# Patient Record
(demographics unavailable — no encounter records)

---

## 2017-06-07 NOTE — EKG REPORT
SEVERITY:- BORDERLINE ECG -

SINUS RHYTHM

LOW VOLTAGE IN FRONTAL LEADS

CONSIDER INFERIOR INFARCT

:

Confirmed by: Amena Delaney MD 07-Jun-2017 16:18:13

## 2017-07-24 NOTE — ER DOCUMENT REPORT
ED Respiratory Problem





- General


Chief Complaint: Shortness Of Breath


Stated Complaint: DIFFICULTY BREATHING


Time Seen by Provider: 07/24/17 20:48


Notes: 


The patient is a 60-year-old female, past medical history COPD, asthma, 

presents with several hours of worsening shortness of breath that started after 

she exited a store today.  She tried her albuterol inhaler without much relief 

of her symptoms.  She called 911 and received 125 mg Solu-Medrol, a DuoNeb and 

an albuterol inhaler with some relief of her symptoms.  She is still short of 

breath on arrival to the emergency room.  She denies leg swelling, nausea, 

vomiting, chest pain, fevers, back pain, headache or abdominal pain.


TRAVEL OUTSIDE OF THE [place] IN LAST 30 DAYS: No





- Related Data


Allergies/Adverse Reactions: 


 





No Known Allergies Allergy (Verified 05/20/14 08:27)


 











Past Medical History





- General


Information source: Patient





- Social History


Smoking Status: Current Every Day Smoker


Family History: Reviewed & Not Pertinent





- Past Medical History


Cardiac Medical History: Reports: Hx Atrial Fibrillation


Pulmonary Medical History: Reports: Hx Asthma, Hx Bronchitis, Hx COPD, Hx 

Pneumonia


GI Medical History: Reports: Hx Gastroesophageal Reflux Disease - resolved 

after fundoplication


Psychiatric Medical History: Reports: Hx Depression


Past Surgical History: Reports: Hx Bowel Surgery - Nissen fundoplication, Hx 

Gynecologic Surgery - fibroid removal





- Immunizations


Hx Diphtheria, Pertussis, Tetanus Vaccination: Yes





Review of Systems





- Review of Systems


Notes: 


REVIEW OF SYSTEMS:


CONSTITUTIONAL: -fevers, -chills


EENT: -eye pain, -difficulty swallowing, -nasal congestion


CARDIOVASCULAR:-chest pain, -syncope.


RESPIRATORY: +cough, +SOB


GASTROINTESTINAL:  -abdominal pain, - nausea, -vomiting, -diarrhea


GENITOURINARY: -dysuria, -hematuria


MUSCULOSKELETAL: -back pain, -neck pain


SKIN: -rash or skin lesions.


HEMATOLOGIC: -easy bruising or bleeding.


LYMPHATIC: -swollen, enlarged glands.


NEUROLOGICAL: -altered mental status or loss of consciousness, -headache, -

neurologic symptoms


PSYCHIATRIC: -anxiety, -depression.


ALL OTHER SYSTEMS REVIEWED AND NEGATIVE.





Physical Exam





- Vital signs


Vitals: 


 











Resp Pulse Ox


 


 24 H  99 


 


 07/24/17 20:40  07/24/17 20:40














- Notes


Notes: 


PHYSICAL EXAMINATION:





GENERAL: Well-appearing, well-nourished and in no acute distress.





HEAD: Atraumatic, normocephalic.





EYES: Pupils equal round and reactive to light, extraocular movements intact, 

sclera anicteric, conjunctiva are normal.





ENT: nares patent, oropharynx clear without exudates.  Moist mucous membranes.





NECK: Normal range of motion, supple without lymphadenopathy





LUNGS: Bilateral wheezing, tachypnea





HEART: Tachycardia, regular rhythm





ABDOMEN: Soft, nontender, normoactive bowel sounds.  No guarding, no rebound.  

No masses appreciated.





EXTREMITIES: Normal range of motion, no pitting or edema.  No cyanosis.





NEUROLOGICAL: Cranial nerves grossly intact.  Normal speech, normal gait.  

Normal sensory and motor exams.





PSYCH: Normal mood, normal affect.





SKIN: Warm, Dry, normal turgor, no rashes or lesions noted.  Tachypnea,





Course





- Re-evaluation


Re-evalutation: 


Patient with COPD exacerbation.  After DuoNeb and steroids, patient feels much 

better.  CTA ordered due to tachycardia and slight hypoxia.  It did not show 

any evidence of pulmonary embolism.  Patient knows about her thyroid nodule/

mass and will talk to her primary care physician about further evaluation and 

treatment.  Patient ambulating around ER with oxygen saturation at 88%.  She 

does not wear oxygen at home.  She does not feel short of breath when she 

ambulates.  The 88% may be normal for her due to her long-standing emphysema.  

Offered patient admission for further evaluation and treatment, but she would 

like to be discharged home with follow-up at her primary care physician 

tomorrow.  She understands risks of leaving, including heart attack, death or 

worsening respiratory failure.  She is competent to make her own decisions.  

She has albuterol nebules at home, but is requesting a refill of her albuterol 

inhaler.  We will also provide her with 5 more days of prednisone and 

instructed patient to return immediately to the emergency room if she has any 

worsening shortness of breath or any other concerns.





- Vital Signs


Vital signs: 


 











Temp Pulse Resp BP Pulse Ox


 


 97.6 F   113 H  20   130/87 H  88 L


 


 07/24/17 20:50  07/24/17 20:50  07/25/17 01:30  07/25/17 01:30  07/25/17 01:30














- Laboratory


Result Diagrams: 


 07/24/17 21:05





 07/24/17 22:53


Laboratory results interpreted by me: 


 











  07/24/17 07/24/17





  21:05 22:53


 


Hgb  15.8 H 


 


Seg Neutrophils %  37.2 L 


 


Lymphocytes %  47.7 H 


 


Glucose   134 H














- Diagnostic Test


Radiology reviewed: Image reviewed, Reports reviewed


Radiology results interpreted by me: 


CXR: NAD





- EKG Interpretation by Me


EKG shows normal: Sinus rhythm


Rate: Tachycardia


Axis/QRS: IVCD


When compared to previous EKG there are: No significant change





Discharge





- Discharge


Clinical Impression: 


 COPD exacerbation





Condition: Stable


Disposition: AGAINST MEDICAL ADVICE


Additional Instructions: 


UPPER RESPIRATORY ILLNESS:





     You have a viral infection of the respiratory passages -- a "cold."  This 

common infection causes nasal congestion, drainage, and often sore throat and 

cough.  It is highly contagious.  The disease usually lasts about 10 to 14 days.


     There is no "cure" for the viral infection -- it must run its course.  If 

there is a complication, such as bacterial infection in the nose, sinuses, 

middle ear, or bronchial tubes, antibiotics may be required.  The antibiotics 

won't affect the virus.


     Drink plenty of fluids.  A humidifier may help.  An expectorant medication 

or decongestant may make you more comfortable.  Use acetaminophen or ibuprofen 

for fever or aches.


     See the doctor if fever persists over two days, if there is any 

significant worsening of your symptoms, or if you simply fail to improve as 

expected.








BRONCHOSPASM:





     You have tightness in the bronchial tubes, called bronchospasm. This often 

occurs with bronchial infections.  Allergies, inhaled chemicals, and polluted 

or cold air can also provoke bronchospasm. It's more likely in patients with 

asthma in the family.


     Emergency treatment of bronchospasm may include adrenaline shots or 

bronchodilator aerosol.  You may feel lightheaded and have a rapid pulse for an 

hour or two.  Rest and get plenty of fluids.


     At home, we'll treat you with a bronchodilator inhaler. Antibiotics and 

corticosteroids may be required for some patients. Until you recover, avoid 

chemical fumes, dusts, pollens, and exercising in very cold or dry air. If you 

smoke, stop now!!


     If you develop a fever, increased wheezing, chest pain, or severe 

shortness of breath, you should contact the doctor immediately.








INHALED BRONCHODILATORS:


     You have received a treatment of and/or prescription for an inhaled 

bronchodilator -- a medication which stimulates the airways in the lung to 

dilate.  This improves the flow of air in asthma, bronchitis, and emphysema.


     These medicines have some similarity to adrenaline, and can cause similar 

side effects:  shakiness, racing heart, and a sense of nervousness.  These side 

effects decrease with time.  Contact your doctor if these side effects are 

severe.


     Do not over-use the medicine.  Too-frequent use of the inhaler may make it 

ineffective.  Call your doctor if the inhaler is not controlling your symptoms 

at the prescribed doses.








STEROID MEDICATION:


     You have been given an injection of or oral medicine of the cortisone/

steroid class.  This medication is used to control inflammation or allergy.  

David t is usually only given for a short period of time, until the acute process 

subsides.


     There are usually no side effects from short-term use of cortisone-like 

medications.  Some persons feel an increased sense of well-being and are not 

sleepy at bedtime.  Long-term use of cortisone medications is best avoided, 

unless required for a severe condition.  If your condition does not remit, or 

relapses after the course of corticosteroid medication, you should consult your 

physician.








USE OF ACETAMINOPHEN (Tylenol):


     Acetaminophen may be taken for pain relief or fever control. It's much 

safer than aspirin, offering a wider range of "safe" dosages.  It is safe 

during pregnancy.  Some brand names are Tylenol, Panadol, Datril, Anacin 3, 

Tempra, and Liquiprin. Acetaminophen can be repeated every four hours.  The 

following are maximum recommended dosages:


>89 pounds or adults          650 mg to 900 mg


Acetaminophen can be repeated every four hours.  Maximum dose not to exceed 

4000 mg a day.








SMOKING:


     If you smoke, you should stop smoking.  The tar and chemicals in cigarette 

smoke are harmful.  Smoking has been shown to cause:


          emphysema


          chronic bronchitis


          lung cancer


          mouth and throat cancer


          stomach and pancreas cancer


          premature aging


          birth defects


     In addition, smoking increases ear and lung infections in children of 

smokers.








FOLLOW-UP CARE:


If you have been referred to a physician for follow-up care, call the physician

s office for an appointment as you were instructed or within the next two days.

  If you experience worsening or a significant change in your symptoms, notify 

the physician immediately or return to the Emergency Department at any time for 

re-evaluation.





Prescriptions: 


Albuterol Sulfate [Proair HFA Inhalation Aerosol 8.5 gm MDI] 2 puff IH Q4H PRN #

1 mdi


 PRN Reason: 


Prednisone [Deltasone 20 mg Tablet] 3 tab PO DAILY 5 Days


Forms:  Return to Work

## 2017-07-25 NOTE — EKG REPORT
SEVERITY:- ABNORMAL ECG -

SINUS OR ECTOPIC ATRIAL TACHYCARDIA

NONSPECIFIC IVCD WITH LAD

INFERIOR INFARCT, OLD

LATERAL INFARCT, AGE INDETERMINATE

ABNRM R PROG, CONSIDER ASMI OR LEAD PLACEMENT

:

Confirmed by: Amena Delaney MD 25-Jul-2017 12:47:00

## 2017-07-25 NOTE — RADIOLOGY REPORT (SQ)
EXAM DESCRIPTION:  CTA CHEST



COMPLETED DATE/TIME:  7/25/2017 12:31 am



REASON FOR STUDY:  SOB, tachycardia, abnormal EKG



COMPARISON:  Chest x-ray 7/24/2017, CT chest 6/24/2015.



TECHNIQUE:  CT scan of the chest performed using helical scanning technique with dynamic intravenous 
contrast injection.  Images reviewed with lung, soft tissue and bone windows.  Reconstructed coronal 
and sagittal MPR images reviewed.

Additional 3 dimensional post-processing performed to develop Maximal Intensity Projection images (MI
P).  All images stored on PACS.

All CT scanners at this facility use dose modulation, iterative reconstruction, and/or weight based d
osing when appropriate to reduce radiation dose to as low as reasonably achievable (ALARA).

[facility]: Dose Right  [facility]: CareDose    [facility]: Dose Right    [facility]: Teradose 4D    [facility]: Smart Technologies



CONTRAST TYPE AND DOSE:  contrast/concentration: Isovue 370.00 mg/ml; Total Contrast Delivered: 100.0
 ml; Total Saline Delivered: 45.0 ml



RENAL FUNCTION:  Creatinine 0.78



RADIATION DOSE:  Up-to-date CT equipment and radiation dose reduction techniques were employed. CTDIv
ol: 14.3 mGy. DLP: 495 mGy-cm. .



LIMITATIONS:  There is motion artifact.



FINDINGS:  LUNGS AND PLEURA: Emphysematous changes are noted.  No consolidation, pleural effusion pne
umothorax.

AORTA AND GREAT VESSELS: No thoracic aortic aneurysm or dissection.

HEART: No pericardial effusion.

PULMONARY ARTERIES: No emboli visualized in the main pulmonary arteries or the segmental branches.

HILAR AND MEDIASTINAL STRUCTURES: No identified masses or abnormal nodes.

HARDWARE: None in the chest.

UPPER ABDOMEN: Surgical clips at the GE junction.  No acute findings.  Limited exam.

THYROID AND OTHER SOFT TISSUES: There is a 1.4 x 0.8 cm hypodense nodule at the right thyroid lobe.

BONES: No acute osseous findings.

3D MIPS: Confirm above findings.



IMPRESSION:  No CT evidence for pulmonary emboli.

Emphysema.

1.4 x 0.8 cm hypodense nodule at the right thyroid lobe.  This can be better evaluated with dedicated
 thyroid ultrasound.



TECHNICAL DOCUMENTATION:  JOB ID:  [id number]

[facility]

Quality ID # 436: Final reports with documentation of one or more dose reduction techniques (e.g., Au
tomated exposure control, adjustment of the mA and/or kV according to patient size, use of iterative 
reconstruction technique)

 2011 [employer]- All Rights Reserved

## 2017-08-07 NOTE — XCELERA REPORT
[address] [address]

                             Tel: [phone]

                             Fax: [phone]



                    Lower Extremity Arterial Evaluation

____________________________________________________________________________



Name: EWELINA POTTS

MRN: [id number]                Age: 60 yrs

Gender: Female                 : 1956

Patient Status: Outpatient     Patient Location: 

Account #: [id number]

Study Date: 2017 03:19 PM

Accession #: [id number]

____________________________________________________________________________



Procedure: A color flow and duplex scan of the lower extremity arteries was

performed bilaterally with velocity and waveform anaylsis. Ankle brachial

indicies performed.

Reason For Study: PVD





Ordering Physician: RICHMOND ENNIS

Performed By: Sam Marcum

____________________________________________________________________________



____________________________________________________________________________





Measurements and Calculations



                                   Right         Left

  CFA PSV                          122.6        116.6    cm/sec

  Prox PFA PSV                     -93.2        -66.0    cm/sec

  Dist SFA PSV                     -78.6        -69.1    cm/sec

  Dist Pop A PSV                    71.7         47.3    cm/sec

  Dist FRANNIE PSV                      71.1         67.4    cm/sec

  Dist PTA PSV                      47.8         47.5    cm/sec

  Sly Pedis PSV                     79.9         57.3    cm/sec



____________________________________________________________________________



Right Side Arterial Evaluation

Normal velocity and triphasic waveforms noted from the Common Femoral

artery to the infregeniculate vessels.



0 % stenosis noted.



Ankle Brachial index is 1.04.



Left Side Arterial Evaluation

Normal velocity and triphasic waveforms noted from the Common Femoral

artery to the infregeniculate vessels.



0 % stenosis noted.



Ankle Brachial index is 1.06.

____________________________________________________________________________



Interpretation Summary

No hemodynamically significant lesions in the bilateral lower extremities,

on duplex imaging, at rest.

____________________________________________________________________________



Electronically signed by:      Lennox Williams      on 2017 04:17 PM



CC: RICHMOND ENNIS

>

Williams, Lennox

## 2017-08-09 NOTE — RADIOLOGY REPORT (SQ)
EXAM DESCRIPTION:  U/S THYROID/SFT TISS HD   NECK



COMPLETED DATE/TIME:  8/9/2017 5:20 pm



REASON FOR STUDY:  NONTOXIC SINGLE THYROID NODULE, ABNORMAL WEIGHT LOSS E04.1  NONTOXIC SINGLE THYROI
D NODULE



COMPARISON:  None.



TECHNIQUE:  Dynamic and static gray-scale images acquired of the thyroid gland. Selected additional c
olor/power Doppler images recorded.  All images stored to PACS.



LIMITATIONS:  None.



FINDINGS:  RIGHT LOBE: Normal size, 3.9 x 1.9 x 2.5 cm.  Homogeneous echotexture.  There is complex n
odule in the midportion of the right lobe measuring 11 x 9 x 11 mm.  Multiple smaller nodules are pre
sent in the right lobe.

LEFT LOBE: Normal size, 3.9 x 1.6 x 1.6 cm.  Homogeneous echotexture.  Multiple small hypoechoic nodu
les are present.

ISTHMUS: Normal size, 2.9 mm.  Homogeneous echotexture.  No cystic or solid masses.

OTHER: No other significant finding.



IMPRESSION:  Multinodular goiter.



TECHNICAL DOCUMENTATION:  JOB ID:  [id number]

 [employer]- All Rights Reserved

## 2017-08-11 NOTE — RADIOLOGY REPORT (SQ)
EXAM DESCRIPTION:  CT ABDOMEN WITH IV   ORAL CONT



COMPLETED DATE/TIME:  8/11/2017 9:33 am



REASON FOR STUDY:  COLON POLYP, WEIGHT LOSS D12.6  BENIGN NEOPLASM OF COLON, UNSPECIFIED R63.4  ABNOR
MAL WEIGHT LOSS



COMPARISON:  None.



TECHNIQUE:  CT scan of the abdomen performed with intravenous and with oral contrast using helical sc
anning technique with dynamic intravenous contrast injection. Images reviewed with lung, soft tissue,
 and bone windows. Reconstructed coronal and sagittal MPR images reviewed. Delayed images for evaluat
ion of the urinary system also acquired and evaluated.

All images stored on PACS. All CT scanners at this facility use dose modulation, iterative reconstruc
tion, and/or weight based dosing when appropriate to reduce radiation dose to as low as reasonably ac
hievable (ALARA).

[facility]: Dose Right  [facility]: CareDose  [facility]: Dose Right    [facility]: Teradose 4D    [facility]: Smart Technologies



CONTRAST TYPE AND DOSE:  contrast/concentration: Isovue 370.00 mg/ml; Total Contrast Delivered: 66.0 
ml; Total Saline Delivered: 65.0 ml



RENAL FUNCTION:  Creatinine 0.7 BUN 9



RADIATION DOSE:  Up-to-date CT equipment and radiation dose reduction techniques were employed. CTDIv
ol: 3.8 - 4.5 mGy. DLP: 268 mGy-cm. .



LIMITATIONS:  None.



FINDINGS:  LOWER CHEST: No significant findings. No nodules or infiltrates.

LIVER: Normal size. No masses.  No dilated ducts.

SPLEEN: Normal size. No focal lesions.

PANCREAS: No masses. No significant calcifications. No adjacent inflammation or peripancreatic fluid 
collections. Pancreatic duct not dilated.

GALLBLADDER: No identified stones by CT criteria. No inflammatory changes to suggest cholecystitis.

ADRENAL GLANDS: No significant masses or asymmetry.

RIGHT KIDNEY AND URETER: No solid masses.   No significant calcifications.   No hydronephrosis or hyd
roureter.

LEFT KIDNEY AND URETER: No solid masses.   No significant calcifications.   No hydronephrosis or hydr
oureter.

AORTA AND VESSELS: No aneurysm. No dissection. Renal arteries, SMA, celiac without stenosis.  Mild at
herosclerosis.

RETROPERITONEUM: No retroperitoneal adenopathy, hemorrhage or masses.

BOWEL AND PERITONEAL CAVITY: There appears to be focal thickening of the wall of the colon just above
 the cecum.  It is possible that this represents adherent stool appear

APPENDIX: Not identified.

ABDOMINAL WALL: No masses. No hernias.

BONES: No significant or acute findings.

OTHER: No other significant finding.



IMPRESSION:  Possible mass or polyp in the colon just above the cecum.  No other abnormality is appre
ciated.



TECHNICAL DOCUMENTATION:  JOB ID:  [id number]

Quality ID # 436: Final reports with documentation of one or more dose reduction techniques (e.g., Au
tomated exposure control, adjustment of the mA and/or kV according to patient size, use of iterative 
reconstruction technique)

 2011 [employer]- All Rights Reserved

## 2017-08-18 NOTE — OPERATIVE REPORT
Operative Report


DATE OF SURGERY: 08/18/17


PREOPERATIVE DIAGNOSIS: right colon polyp


POSTOPERATIVE DIAGNOSIS: Right colon polyp, spigelian hernia.


OPERATION: Right hemicolectomy.  Spigelian hernia repair.


SURGEON: AMEENA FINLEY


ANESTHESIA: GA


TISSUE REMOVED OR ALTERED: Right colon


COMPLICATIONS: 





None


ESTIMATED BLOOD LOSS: 100 cc


INTRAOPERATIVE FINDINGS: Large mass in the right mid colon.  Spigelian hernia 

with herniation of the appendix on the right side.


PROCEDURE: 





Informed consent was obtained.  Patient was brought to the operating room and 

placed on the operating room table in the supine position.  After satisfactory 

induction of general anesthesia patient's abdomen was prepped and draped in 

usual sterile fashion.  A midline abdominal incision was made dissection 

carried out through the fascia and the peritoneal cavity entered without 

difficulty.  A wound protractor was used during the case.  Exploratory 

laparotomy was performed first.  The liver felt smooth with no masses.  

Anterior surface of the stomach felt smooth.  There were some adhesions of the 

stomach to the anterior abdominal wall consistent with her prior ulcer 

surgeries.  These adhesions were left alone.  The gallbladder appeared normal.  

The uterus and the ovaries were palpable and they felt normal.  The small bowel 

appeared normal.  The right colon had a Maryjo ink marking at the midportion 

with palpable mass.  The appendix was adhered to the anterior right abdominal 

wall.  Otherwise the anterior abdominal wall felt normal.  In tracing the 

appendix became very apparent that the appendix was herniating through a 

spigelian hernia on the right side.  The spigelian hernia defect had to be 

widened to allow exposure of the appendiceal tip which extended deep into the 

abdominal wall.  A counter incision on the skin had to be made at the right 

abdomen to allow exposure.  The appendix tip was clearly identified dissected 

away from the abdominal wall.  At the end of the case the spigelian hernia 

defect was closed with interrupted PDS sutures.  The right colon was mobilized 

and the hepatic flexure was completely taken down.  The duodenum was identified 

and protected during the dissection as well as the right ureter.  The 

transverse colon just right of midline was divided using a CHELSEA stapling device.

  The middle colic artery was identified and preserved.  The marginal vessel 

was identified and taken with the LigaSure device.  The transverse colon end 

had an excellent blood supply with visible pulsations in the mesentery.  The 

ileocolic artery was identified and traced down to its origin and taken close 

to its origin by clamping dividing and tying.  This allowed a complete 

mesenteric excision of the right colon.  The terminal ileum was divided about 

10 cm away from the ileocecal junction using a CHELSEA stapling device.  The 

specimen was sent to pathology and pathology confirmed that indeed the large 

mass in the right colon had been removed with good margins.  Ileocolic 

anastomosis was then created between the terminal ileum and the transverse 

colon in a side-to-side functional end-to-end fashion using a CHELSEA stapling 

device.  The enterotomies made to introduce the stapling device was closed with 

a TA stapling device.  The mesenteric defect was very large and was left open.  

The anastomosis appeared secure and well vascularized.  Hemostasis appeared to 

be good.  Sponge needle and instrument counts were all correct.  The midline 

fascial defect was closed with running PDS suture.  Skin was closed with 

jarocho.  Marcaine was injected at the incision sites.  Patient tolerated 

procedure well with no apparent complications and was taken to the recovery 

area in stable condition.

## 2017-08-18 NOTE — CONSULTATION REPORT E
Consultation Report



NAME: EWELINA POTTS

MRN:  [id number]               : 1956      AGE: 60Y

DATE: 2017    534  A



TO:   TABITHA JOSE M.D.



FROM: CLINT FINLEY M.D.

      Requesting Physician



REASON FOR CONSULTATION:

Asthma/COPD and hyperthyroidism.



HISTORY OF PRESENT ILLNESS:

The patient is a 60-year-old -American female with a history of

COPD, anxiety, peripheral vascular disease, who presented to the hospital

for a right hemicolectomy and appendectomy due to a right colonic mass. 

Please see Dr. Finley's note and H and P for the indications for this

procedure.  The patient reports that Tuesday was the end of a small

steroid taper that she had taken for an asthma exacerbation.  She reports

that she does not normally take steroids daily, however, has had 2 short

courses of steroids within the last month.



PAST MEDICAL HISTORY:

The patient's past medical history is significant for:

1.  COPD.

2.  Questionable history of peripheral vascular disease.

3.  Anxiety.

4.  And a thyroid nodule.



PAST SURGICAL HISTORY:

Peptic ulcer disease about 26 years ago.



SOCIAL HISTORY:

The patient smokes a little less than a pack per day for many years. 

Denies alcohol.  Denies illicit drugs.



FAMILY HISTORY:

Her mother  of kidney cancer.  There is a family history of diabetes

mellitus, and her father's history is unknown.



ALLERGIES:

The patient's no known drug allergies.



HOME MEDICATIONS:

Include:

1.  Albuterol.

2.  BREO.

3.  Spiriva.

4.  And the patient recently completed a short course of prednisone.



CODE STATUS:

She is a FULL CODE.  Her sonKenton is her surrogate decision maker. 

Her primary care physician is Dr. Jansen.  Her surgeon is Dr. Finley.



REVIEW OF SYSTEMS:

CONSTITUTIONAL:  The patient denies fevers, chills, weight gain, anorexia.

Admits to weight loss, about 25 pounds over the last year.



HEENT:  Denies visual disturbance, headache, hearing loss.



RESPIRATORY:  Admits to occasional dyspnea.  Denies cough, hemoptysis,

pleurisy.



CARDIAC:  Denies chest pain, PND, orthopnea, edema.



ABDOMEN:  Admits currently to abdominal pain, however, she just had recent

abdominal surgery.



GENITOURINARY:  Denies dysuria, urgency, frequency, hematuria.



SKIN:  Denies rashes or wounds.



MUSCULOSKELETAL:  Denies joint pain, swelling.



NEUROLOGIC:  Denies weakness, numbness, dizziness, dysphagia, dysarthria,

ataxia.



ENDOCRINE:  Denies polyuria, polydipsia, hot or cold intolerance.



PSYCHIATRIC:  Denies depression.  Admits to some anxiety.  Denies

hallucination or delusion.



HEMATOLOGY:  Denies easy bleeding or bruising.



PHYSICAL EXAMINATION:

VITAL SIGNS:  Temperature is 98.5, pulse is 94, blood pressure of 129/82,

respiratory rate of 16, and saturation of 98 on 2 liters nasal cannula.



GENERAL:  The patient appears well.  She is in no acute distress.  She is

slightly lethargic but easily arousable after surgery.



HEENT:  She is normocephalic.  She has no icterus.  Her conjunctivae are

clear.  Her extraocular eye movements are intact.  Her pupils are equal,

round, and reactive to light and accommodation.  She has moist mucous

membranes.



NECK:  Her trachea is midline.  She does have a right-sided thyromegaly

with palpable nodules.



RESPIRATORY:  Clear to auscultation bilaterally without wheezes, rhonchi,

or rales.  She does have a prolonged expiratory phase.



CARDIOVASCULAR:  Regular rate and rhythm without appreciable murmur, rub,

or gallop.



ABDOMEN:  She does have a midline incision that is covered with waffle

dressing with sanguinous drainage.  The patient is diffusely tender. 

Absent bowel sounds.



RECTAL:  Deferred.



GENITOURINARY:  Deferred.



EXTREMITIES:  Reveal no edema, cyanosis, or clubbing.



MUSCULOSKELETAL:  Reveals no joint swelling or deformity.



VASCULAR:  Reveals normal peripheral pulses.



NEUROLOGIC:  She is alert and oriented to person, place, and time.  Her

speech is normal.  Cranial nerves are grossly intact.  Her strength is

equal in all extremities, and tactile sensation in all extremities.



SKIN:  Reveals no rashes, wounds, or worrisome skin lesions.



PSYCHIATRIC:  She has a normal mood and affect.



LABORATORY VALUES:

Most recent laboratory values are from 2017 and reveal a white count

of 5.1, hemoglobin of 15.2, hematocrit of 45.1, and platelets of 277,000;

a CEA of 4.8; a TSH of 0.39, a free T4 of 1.39, and a free T3 of 4.46; a

creatinine of 0.7 and a potassium of 4.6.  Thyroid ultrasound done on

2017 reveals a multinodular goiter with a complex nodule in the

midportion of the right lobe measuring 11 x 9 x 11 mm and multiple smaller

nodules present in the left lobe and the left lobe has small hypoechoic

nodules present.  The patient's CT of the abdomen done on 2017

reveals a possible mass or polyp in the colon just above the cecum.



IMPRESSION:

This is a 60-year-old -American female with:

1.  Status post right hemicolectomy and appendectomy for colonic mass.

2.  Subclinical hyperthyroidism, likely secondary to multinodular goiter

versus toxic adenoma.

3.  COPD.

4.  Tobacco abuse.



INPATIENT MEDICATIONS:

1.  Albuterol.

2.  Ertapenem 1 gram preoperatively.

3.  Hydrocortisone 50 mg IV q.12.

4.   mL IV.

5.  Morphine 3 mg IV q.3 p.r.n.

6.  Zofran 4 mg IV q.4 p.r.n.

7.  Spiriva.

8.  BREO.

9.  Normal saline at 120 mL per hour.



PLAN:

1.  For patient's asthma/COPD, at this time I do not feel that this

patient is likely steroid dependent nor does it sound that way from

listing to her.  Ultimately though I defer the use of corticosteroids to

her surgeon.  We will place p.r.n. albuterol and do recommend that patient

continue her own BREO.  Continue Spiriva.  Incentive spirometry and early

mobilization.



2.  For her colonic mass, abdominal surgery, and/or all problems and/or

issues relating to this, I defer to her excellent primary surgeon.



3.  For her tobacco abuse, I strongly recommend that she does stop

smoking, and we will place a p.r.n. nicotine patch.



4.  For her thyroid nodule, it appears that the patient is already engaged

in an outpatient workup of this.  The patient has undergone ultrasound of

the thyroid as well as testing which does seem to indicate some clinical

hyperthyroidism.  Whether this is the result of her multinodular goiter or

a toxic adenoma at this time is yet to be known.  I do recommend that this

patient undergo bone density testing as an outpatient and have a repeat

TSH and free T3 and free T4 within the next 6 weeks.  Ultimately, patient

should have a radioactive uptake scan of her thyroid to determine if these

nodules are indeed active.  At this time, I feel that this workup can be

deferred to on an outpatient basis.  The patient does admit to some weight

loss and occasional palpitations, but no other signs of overt

hyperthyroidism at this time.



We appreciate this most interesting consult, and we will continue to

follow this patient.



Please note that we deferred DVT prophylaxis other than SCDs which are

currently in place to the prerogative of the attending surgeon.





DICTATING PHYSICIAN: TABITHA JOSE M.D.





[id number]                  DT: 2017

PHY#: [id number]            DD: 2017

ID:   [id number]           JOB#: [id number]      ACCT: [id number]



cc:TABITHA JOSE M.D.

>

## 2017-08-18 NOTE — PDOC PROGRESS REPORT
Subjective


Progress Note for:: 08/18/17


Subjective:: 


Drowsy but easily arousable.  No distress.








Physical Exam


Vital Signs: 


 











Temp Pulse Resp BP Pulse Ox


 


 98.2 F   136 H  18   166/100 H  95 


 


 08/18/17 13:10  08/18/17 13:10  08/18/17 13:10  08/18/17 13:10  08/18/17 13:10








 Intake & Output











 08/17/17 08/18/17 08/19/17





 06:59 06:59 06:59


 


Intake Total   5150


 


Output Total   2100


 


Balance   3050


 


Weight  61.23 kg 











General appearance: PRESENT: no acute distress, cooperative


Respiratory exam: PRESENT: clear to auscultation taniya


Cardiovascular exam: PRESENT: RRR


GI/Abdominal exam: PRESENT: other - Soft, nondistended, appropriate tenderness.


Extremities exam: PRESENT: other - No swelling.





Assessment & Plan





- Diagnosis


(1) Colon polyp


Qualifiers: 


   Colon location: ascending 


Is this a current diagnosis for this admission?: Yes   


Plan: 


Status post right hemicolectomy and spigelian hernia repair.  Patient looks 

good postoperatively.  Was noted with tachycardia in the operating room prior 

to incision and  tachycardia postoperatively, currently resolved.  Patient with 

thyroid goiter.   Patient may benefit from a beta-blocker however she has 

significant asthma issues.  Will consult hospitalist for their input.  She has 

required steroids in the past.  I will place her on low-dose stress steroids in 

the immediate postoperative period.  Await bowel function.  If NG output is 

minimal will DC NG tube tomorrow.  Encourage ambulation.

## 2017-08-19 NOTE — PDOC PROGRESS REPORT
Subjective


Progress Note for:: 08/19/17


Subjective:: 


Patient complains of sore throat. 


Patient is yet to pass flatus.


She complains of nausea and abdominal pain.


Patient has dark NG aspirate tinged with blood.








Physical Exam


Vital Signs: 


 











Temp Pulse Resp BP Pulse Ox


 


 97.9 F   91   18   130/71 H  97 


 


 08/19/17 12:00  08/19/17 12:00  08/19/17 12:00  08/19/17 12:00  08/19/17 12:00








 Intake & Output











 08/18/17 08/19/17 08/20/17





 06:59 06:59 06:59


 


Intake Total  5550 


 


Output Total  3355 


 


Balance  2195 


 


Weight 61.23 kg 64 kg 











Exam: 





General: Awake alert and oriented x3, no acute respiratory distress


HEENT: AT/NC, PERRL, EOMI, oropharynx is moist, pink, no scleral icterus, no 

conjunctival injection, NG right nares


Neck: No JVD, trachea midline


Chest: Prolonged expiratory phase, Clear to auscultation bilaterally, no 

wheezes rhonchi or rales


CV: Regular rate and rhythm, normal S1 and S2, no murmur, rub, or gallop


Abdomen: Soft, appropriately, diffusely tender to palpation, nondistended, 

absent bowel sounds; no rebound, rigidity, or guarding


Extremities: No cyanosis, clubbing or edema


Neuro: Cranial nerves II through XII are grossly intact without focal deficits; 

awake alert and oriented x3


Psych: Normal mood and affect


Skin: no rashes or lesions





Results


Laboratory Results: 


 





 08/19/17 05:35 





 08/19/17 05:35 





 











  08/19/17 08/19/17





  05:35 05:35


 


WBC  11.8 H 


 


RBC  4.01 


 


Hgb  12.4 


 


Hct  36.9 


 


MCV  92 


 


MCH  30.8 


 


MCHC  33.5 


 


RDW  13.6 


 


Plt Count  235 


 


Sodium   133.0 L


 


Potassium   4.5


 


Chloride   106


 


Carbon Dioxide   24


 


Anion Gap   3 L


 


BUN   7


 


Creatinine   0.60


 


Est GFR ( Amer)   > 60


 


Est GFR (Non-Af Amer)   > 60


 


Glucose   100


 


Calcium   9.0














Assessment & Plan





- Diagnosis


(1) Colonic mass


Is this a current diagnosis for this admission?: Yes   


Plan: 


patient is s/p partial colectomy for this


Defer this and all issues relating to the diagnosis, management and treatment 

of this and its complications, if any, to the primary sugical team








(2) COPD (chronic obstructive pulmonary disease)


Qualifiers: 


   COPD type: emphysema   Emphysema type: unspecified   Qualified Code(s): 

J43.9 - Emphysema, unspecified   


Is this a current diagnosis for this admission?: Yes   


Plan: 


Currently well controlled


Continue home Breo, spiriva, prn albuterol


Currently receiving stress dose steroids, but defer to surgery to stop these as 

patient does not report chronic prednisone usage, but only intermittent use of 

corticosteroids 








(3) Thyroid nodule


Is this a current diagnosis for this admission?: Yes   


Plan: 


Advise repeating thyroid function tests in 4 weeks.


Patient should have radioactive iodine uptake test to see if these nodules are 

hot or cold. This can be done as an outpatient. No need for intervention at 

this time. 











  08/07/17





  11:40


 


TSH  0.39 L


 


Free T4  1.39


 


Free T3 pg/mL  4.46














(4) Subclinical hyperthyroidism


Is this a current diagnosis for this admission?: Yes   


Plan: 





Advise repeating these in 4 weeks.


Patient should have radioactive iodine uptake test to see if these nodules are 

hot or cold. This can be done as an outpatient. No need for intervention at 

this time. 











  08/07/17





  11:40


 


TSH  0.39 L


 


Free T4  1.39


 


Free T3 pg/mL  4.46














(5) Anxiety


Is this a current diagnosis for this admission?: Yes   


Plan: 


Currently well controlled











(6) Tobacco dependence


Is this a current diagnosis for this admission?: Yes   


Plan: 


Advise cessation 


Offered nicotine replacement


 











Generic Name Dose Route Start Last Admin





  Trade Name Freq  PRN Reason Stop Dose Admin


 


Nicotine  1 each  08/18/17 18:42  





  Nicoderm 14 Mg/24 Hr Transdermal Patch  TD  09/17/17 18:41  





  DAILYP PRN   














- Time


Time Spent with patient: 25-34 minutes


Medications reviewed and adjusted accordingly: Yes


Anticipated discharge: Home





- Plan Summary


Plan Summary: 





At this time, agree with the excellent management of the primary surgeon and 

will sign off as medicine is not providing any additional care to patient. We 

are available at any time for reconsultation on this patient. Thank you kindly 

for this consult.

## 2017-08-19 NOTE — PDOC PROGRESS REPORT
Subjective


Progress Note for:: 08/19/17


Subjective:: 


feels ok. 








Physical Exam


Vital Signs: 


 











Temp Pulse Resp BP Pulse Ox


 


 98.6 F   97   16   126/76 H  99 


 


 08/19/17 07:48  08/19/17 07:48  08/19/17 07:48  08/19/17 07:48  08/19/17 07:48








 Intake & Output











 08/18/17 08/19/17 08/20/17





 06:59 06:59 06:59


 


Intake Total  5550 


 


Output Total  3355 


 


Balance  2195 


 


Weight 61.23 kg 64 kg 











General appearance: PRESENT: no acute distress, cooperative


Respiratory exam: PRESENT: clear to auscultation taniya


Cardiovascular exam: PRESENT: RRR


GI/Abdominal exam: PRESENT: other - soft, non distended, appropriate 

tenderness. ng output bile tinged


Extremities exam: PRESENT: other - no swelling.





Results


Laboratory Results: 


 





 08/19/17 05:35 





 08/19/17 05:35 





 











  08/19/17 08/19/17





  05:35 05:35


 


WBC  11.8 H 


 


RBC  4.01 


 


Hgb  12.4 


 


Hct  36.9 


 


MCV  92 


 


MCH  30.8 


 


MCHC  33.5 


 


RDW  13.6 


 


Plt Count  235 


 


Sodium   133.0 L


 


Potassium   4.5


 


Chloride   106


 


Carbon Dioxide   24


 


Anion Gap   3 L


 


BUN   7


 


Creatinine   0.60


 


Est GFR ( Amer)   > 60


 


Est GFR (Non-Af Amer)   > 60


 


Glucose   100


 


Calcium   9.0














Assessment & Plan





- Diagnosis


(1) Colon polyp


Qualifiers: 


   Colon location: ascending 


Is this a current diagnosis for this admission?: Yes   


Plan: 


Status post right hemicolectomy and spigelian hernia repair.  Patient looks 

good postoperatively.  good urine output and hemodynamically stable. hct 

decreased likely due to hemodilution but will hold lovenox. repeat cbc 

tomorrow. ambulate. await bowel function.

## 2017-08-21 NOTE — PDOC PROGRESS REPORT
Subjective


Progress Note for:: 08/21/17


Subjective:: 





feels well. passing flatus





Physical Exam


Vital Signs: 


 











Temp Pulse Resp BP Pulse Ox


 


 98.2 F   93   18   124/88 H  95 


 


 08/21/17 15:29  08/21/17 15:29  08/21/17 15:29  08/21/17 15:29  08/21/17 15:29








 Intake & Output











 08/20/17 08/21/17 08/22/17





 06:59 06:59 06:59


 


Intake Total 4200 1250 300


 


Output Total 1520 2300 


 


Balance 2680 -1050 300


 


Weight 64 kg 59.6 kg 











General appearance: PRESENT: no acute distress, cooperative


Respiratory exam: PRESENT: clear to auscultation taniya


Cardiovascular exam: PRESENT: RRR


GI/Abdominal exam: PRESENT: normal bowel sounds, soft - nondistended, minimal 

tenderness


Extremities exam: PRESENT: other - no swelling





Results


Laboratory Results: 


 





 08/20/17 05:28 





 08/20/17 05:28 











Assessment & Plan





- Diagnosis


(1) Colon polyp


Qualifiers: 


   Colon location: ascending 


Is this a current diagnosis for this admission?: Yes   


Plan: 


Status post right hemicolectomy and spigelian hernia repair.  Patient looks 

good postoperatively.  d/c carlos d/c claudia.

## 2017-08-22 NOTE — PDOC PROGRESS REPORT
Subjective


Progress Note for:: 08/22/17


Subjective:: 


Feels well.  Passing flatus.  Hungry.








Physical Exam


Vital Signs: 


 











Temp Pulse Resp BP Pulse Ox


 


 98.5 F   81   16   125/74   97 


 


 08/22/17 00:06  08/22/17 00:06  08/22/17 00:06  08/22/17 00:06  08/22/17 00:06








 Intake & Output











 08/21/17 08/22/17 08/23/17





 06:59 06:59 06:59


 


Intake Total 1250 2579 100


 


Output Total 2300 2300 


 


Balance -1050 279 100


 


Weight 59.6 kg 61.7 kg 











General appearance: PRESENT: no acute distress, cooperative - Block phone number


Respiratory exam: PRESENT: chest wall tenderness


Cardiovascular exam: PRESENT: RRR - Achillis is


GI/Abdominal exam: PRESENT: other - Soft, nondistended, active bowel sounds, 

minimal tenderness.


Extremities exam: PRESENT: other - No swelling.





Results


Laboratory Results: 


 





 08/20/17 05:28 





 08/20/17 05:28 











Assessment & Plan





- Diagnosis


(1) Colon polyp


Qualifiers: 


   Colon location: ascending 


Is this a current diagnosis for this admission?: Yes   


Plan: 


Status post right hemicolectomy and spigelian hernia repair.  Patient looks 

good postoperatively.  Start diet.

## 2017-08-23 NOTE — PDOC PROGRESS REPORT
Subjective


Progress Note for:: 08/23/17


Subjective:: 


Feels well.  Tolerating diet well.  Passing gas.  Wants to go home.








Physical Exam


Vital Signs: 


 











Temp Pulse Resp BP Pulse Ox


 


 98.4 F   79   18   127/86 H  100 


 


 08/23/17 07:34  08/23/17 07:34  08/23/17 07:34  08/23/17 07:34  08/23/17 07:34








 Intake & Output











 08/22/17 08/23/17 08/24/17





 06:59 06:59 06:59


 


Intake Total 2579 1630 


 


Output Total 2300 1650 


 


Balance 279 -20 


 


Weight 61.7 kg 59.5 kg 











General appearance: PRESENT: no acute distress, cooperative


Respiratory exam: PRESENT: clear to auscultation taniya


Cardiovascular exam: PRESENT: RRR


GI/Abdominal exam: PRESENT: other - Soft, nondistended, nontender to palpation.

  Wound clean dry and intact.


Extremities exam: PRESENT: other - Non-tender and no swelling





Results


Laboratory Results: 


 





 08/20/17 05:28 





 08/20/17 05:28 











Assessment & Plan





- Diagnosis


(1) Colon polyp


Qualifiers: 


   Colon polyp type: adenomatous   Colon location: ascending   Qualified Code(s)

: D12.2 - Benign neoplasm of ascending colon   


Is this a current diagnosis for this admission?: Yes   


Plan: 


Status post right hemicolectomy and spigelian hernia repair.  Doing well.  

Tolerating diet well.  Will discharge patient home.

## 2017-12-07 NOTE — ER DOCUMENT REPORT
ED General





- General


Stated Complaint: SHORTNESS OF BREATH


Time Seen by Provider: 17 00:36


Cannot obtain history due to: Unstable vital signs


Notes: 





Patient is a 61-year-old female with a past medical history of COPD, chronic 

tobacco dependence, prior admissions to the hospital for her COPD who presents 

with 2 hours of shortness of breath.  Patient reports that she had a relatively 

rapid onset of severe wheezing, shortness of breath, but denies any associated 

hemoptysis, cough, sputum production, or chest pain.  She states this does feel 

similar to her prior COPD exacerbations but worse.  She did try her albuterol 

inhaler at home without any improvement.  EMS was contacted and brought her to 

the emergency department.  They did provide her one DuoNeb in route.  She 

states that this moderately improved her symptoms but does note that she 

continues to feel severely short of breath at time of presentation.  She states 

any form of exertion or movement worsens her shortness of breath.  She denies 

any fever or constitutional symptoms.


TRAVEL OUTSIDE OF THE [place] IN LAST 30 DAYS: No





- Related Data


Allergies/Adverse Reactions: 


 





No Known Allergies Allergy (Verified 17 01:30)


 











Past Medical History





- General


Information source: Patient





- Social History


Smoking Status: Current Every Day Smoker


Frequency of alcohol use: None


Drug Abuse: None


Lives with: Family


Family History: Reviewed & Not Pertinent





- Past Medical History


Cardiac Medical History: Reports: Hx Atrial Fibrillation


   Denies: Hx Pulmonary Embolism


Pulmonary Medical History: Reports: Hx Asthma, Hx COPD, Hx Pneumonia


   Denies: Hx Bronchitis, Hx Respiratory Failure, Hx Sleep Apnea, Hx 

Tuberculosis


Endocrine Medical History: Denies: Hx Graves' Disease


Renal/ Medical History: Denies: Hx End Stage Renal Disease, Hx Kidney Stones, 

Hx Ovarian Cysts, Hx Peritoneal Dialysis, Hx Pelvic Inflammatory Disease


Malignancy Medical History: Denies: Hx Breast Cancer, Hx Cervical Cancer, Hx 

Lung Cancer, Hx Ovarian Cancer


GI Medical History: Reports: Hx Gastroesophageal Reflux Disease, Hx Ulcer.  

Denies: Hx Crohn's Disease, Hx Hiatal Hernia, Hx Irritable Bowel, Hx Liver 

Failure, Hx Pancreatitis


Musculoskeltal Medical History: Reports Hx Arthritis - HANDS, Denies Hx 

Fibromyalgia, Denies Hx Muscular Dystrophy


Psychiatric Medical History: Reports: Hx Depression


   Denies: Hx Bipolar Disorder, Hx Post Traumatic Stress Disorder, Hx 

Schizophrenia


Traumatic Medical History: Denies: Hx Fractures


Past Surgical History: Reports: Hx Gynecologic Surgery - fibroid removal, Hx 

Hysterectomy, Hx Tubal Ligation.  Denies: Hx Appendectomy, Hx Bowel Surgery, Hx 

 Section, Hx Cholecystectomy, Hx Colostomy, Hx Coronary Artery Bypass 

Graft, Hx Gastric Bypass Surgery, Hx Herniorrhaphy, Hx Mastectomy, Hx Pacemaker

, Hx Tonsillectomy





- Immunizations


Hx Diphtheria, Pertussis, Tetanus Vaccination: Yes





Review of Systems





- Review of Systems


Notes: 





Constitutional: Negative for fever.


HENT: Negative for sore throat.


Eyes: Negative for visual changes.


Cardiovascular: Negative for chest pain.


Respiratory: Positive for shortness of breath.


Gastrointestinal: Negative for abdominal pain, vomiting or diarrhea.


Genitourinary: Negative for dysuria.


Musculoskeletal: Negative for back pain.


Skin: Negative for rash.


Neurological: Negative for headaches, weakness or numbness.





10 point ROS negative except as marked above and in HPI.





Physical Exam





- Vital signs


Vitals: 


 











Resp Pulse Ox


 


 16   97 


 


 17 20:06  17 20:06











Interpretation: Tachycardic, Tachypneic


Notes: 





PHYSICAL EXAMINATION:





GENERAL: Ill-appearing, in obvious distress





HEAD: Atraumatic, normocephalic.





EYES: Pupils equal round and reactive to light, extraocular movements intact, 

sclera anicteric, conjunctiva are normal.





ENT: nares patent, oropharynx clear without exudates.  Moderately dry mucous 

membranes.





NECK: Normal range of motion, supple without lymphadenopathy





LUNGS: Poor air movement in all lung fields.  Prolonged expiratory wheezing.  

Unable to speak in more than 2-3 words per sentence.  Breathing approximately 

36 times per minute





HEART: Regular tachycardia without murmurs





ABDOMEN: Soft, nontender, normoactive bowel sounds.  No guarding, no rebound.  

No masses appreciated.





EXTREMITIES: Normal range of motion, no pitting or edema.  No cyanosis.





NEUROLOGICAL: No focal neurological deficits. Moves all extremities 

spontaneously and on command.





PSYCH: Normal mood, normal affect.





SKIN: Warm, Dry, normal turgor, no rashes or lesions noted.





Course





- Re-evaluation


Re-evalutation: 





17 00:40


Patient presents in moderate to severe respiratory distress, breathing 36 times 

a minute, very tight air movement throughout, expiratory wheezing although it 

is quite soft and I expect this is secondary to how tight her air movement is 

in the first place.  She was saturating 91% on room air at time of arrival.  

Immediate upon arrival patient was started on continuous albuterol and 

ipratropium nebulizers, and IV was established, Solu-Medrol and 2 g of 

magnesium will be administered.  IV fluids will be initiated.  Will obtain labs 

and chest x-ray.  If patient is not clinically improving within 15-20 minutes 

on continuous nebulizers will transition to BiPAP.  She is critical at this 

time given her difficulty breathing and will require frequent reassessments.


17 01:19


Patient does have some improved airway movement but remains tight, tachypneic. 

She continues to only be able to say 2-3 words per sentence. Will transition to 

bipap. 


17 02:20


Patient is much improved on BiPAP.  Air movement much improved.  Resting calmly 

in bed, breathing 18 times per minute.  At this time, a discussed with the 

patient my plan to admit her to the hospital and she immediately declined 

stating that she cannot be admitted as she has a son at home with down syndrome 

that she needs to care for.





The patient has chosen to leave the facility against medical advice. The 

relevant issues have been reviewed and discussed with the patient and family at 

the bedside. At the time of this assessment there is no indication for 

involuntary commitment. The patient is alert, oriented, and able to express 

clearly their reasoning for not wanting to remain in the emergency department 

for further treatment. The patient is not clinically psychotic, intoxicated, 

and denies and suicidal ideation. 





Differential or suspected diagnoses based on medical screening exam: Severe 

COPD exacerbation requiring positive pressure ventilation





The patient is aware of the concerning diagnoses and acknowledges understanding 

of the reasons for the following recommendations: Admission to the hospital for 

continued positive pressure ventilation, steroids, continuous nebulizers, 

monitoring for clinical deterioration





The following risks were explained: Worsening COPD exacerbation, respiratory 

distress, death, permanent disability, loss of function 





Clinical impression: Patient is competent to make decisions regarding the 

medical that is being offered.





- Vital Signs


Vital signs: 


 











Temp Pulse Resp BP Pulse Ox


 


       17   111/82   100 


 


       17 03:01  17 03:01  17 03:01














- Laboratory


Result Diagrams: 


 17 00:55





 17 00:55


Laboratory results interpreted by me: 


 











  17





  00:55 01:10


 


VBG pH   7.24 L


 


Sodium  146.3 H 


 


Chloride  112 H 


 


Glucose  111 H 














- Diagnostic Test


Radiology reviewed: Image reviewed, Reports reviewed


Radiology results interpreted by me: 





17 02:21


Chest x-ray: No acute infiltrate or pneumothorax.





Critical Care Note





- Critical Care Note


Total time excluding time spent on procedures (mins): 38


Comments: 





Critical care time spent obtaining history from patient or surrogate, 

discussions with consultants, development of treatment plan with patient or 

surrogate, evaluation of patient's response to treatment, examination of patient

, ordering and performing treatments and interventions, ordering and review of 

laboratory studies, re-evaluation of patient's condition, ordering and review 

of radiographic studies and review of old charts





Discharge





- Discharge


Clinical Impression: 


 Tobacco dependence, Respiratory distress





COPD (chronic obstructive pulmonary disease)


Qualifiers:


 COPD type: unspecified COPD Qualified Code(s): J44.9 - Chronic obstructive 

pulmonary disease, unspecified





Condition: Serious


Disposition: AGAINST MEDICAL ADVICE


Additional Instructions: 


Your leaving the hospital today AGAINST MEDICAL ADVICE.  I have advised you to 

be hospitalized for a severe COPD exacerbation.  We did try to treat your 

symptoms initially with nebulizer treatments, steroids, and magnesium but this 

was not successful in adequately managing your work of breathing.  You were 

then placed on BiPAP which did improve your symptoms.  However, I am unable to 

discharge you if your work of breathing is so severe that it requires BiPAP.  I 

have explained to you that I fear you will go home and get much sicker, have 

worsening of your COPD exacerbation, and may even die.  You will be sent home 

with a course of steroids to try to treat your COPD exacerbation as an 

outpatient.  However, I strongly encourage you to return to the emergency 

department for admission.


Prescriptions: 


Prednisone [Deltasone 20 mg Tablet] 3 tab PO DAILY 5 Days  tablet


Referrals: 


XU KOROMA MD [Primary Care Provider] - Follow up as needed

## 2017-12-07 NOTE — RADIOLOGY REPORT (SQ)
EXAM DESCRIPTION: 



CHEST SINGLE VIEW



CLINICAL HISTORY: 



61 years, Female, sob



COMPARISON:

7/24/2017.



NUMBER OF VIEWS:



1.



TECHNIQUE:

Frontal view.



LIMITATIONS:

None.



FINDINGS:



Moderate emphysematous hyperinflation. Normal cardiac silhouette

size. Atherosclerosis. Intact bony thorax. Surgical clips at the

expected gastroesophageal junction. Stable.



IMPRESSION:



No acute cardiopulmonary findings. Chronic emphysematous

hyperinflation.

 



 2011 [employer]- All Rights Reserved

## 2018-02-23 NOTE — PULMONARY FUNCTION TEST
Pulmonary Function Test


Date of Procedure:: 02/21/18


INDICATION:: Dyspnea


Referring Provider: Dr. JONATHAN Sood


Technician: Reva Cruz CRT





- Report


Spirometry: 





FVC 2.35 L 71%   postbronchodilator 2.50 L 78% 





FEV1 0.76 L 28%   postbronchodilator  0.76 L 28%





FEV1/FVC % 32    postbronchodilator 30    predicted 82


Impression: 





Study demonstrates severe obstructive ventilatory defect with insignificant 

response to bronchodilator therapy.  There is an inference for restrictive 

ventilatory defect (which may mask the degree of obstruction). If clinically 

indicated complete PFTs would be advised

## 2018-07-10 NOTE — ER DOCUMENT REPORT
ED General





- General


Chief Complaint: Breathing Difficulty


Stated Complaint: BREATHING PROBLEM


Time Seen by Provider: 07/10/18 22:10


Mode of Arrival: Medic


Information source: Patient


Notes: 





61-year-old female brought to the emergency department with complaints of 

difficulty breathing and wheezing.  Patient does have a history of COPD.  

Patient is on a nebulizer at home.  Patient states that she saw her family 

doctor last week for her symptoms and was started on prednisone.  She states 

that she finished the prednisone 2 days ago.  Patient's been using the DuoNeb 

treatments without relief of symptoms.  She denies any fever, chills, 

productive cough, chest pain.  Patient states that she still smokes.  Patient 

is not on any oxygen at home.


TRAVEL OUTSIDE OF THE [place] IN LAST 30 DAYS: No





- HPI


Onset: Just prior to arrival


Onset/Duration: Gradual


Quality of pain: No pain


Severity: Moderate


Associated symptoms: None


Exacerbated by: Denies


Relieved by: Denies


Similar symptoms previously: Yes


Recently seen / treated by doctor: Yes





- Related Data


Allergies/Adverse Reactions: 


 





No Known Allergies Allergy (Verified 17 01:30)


 











Past Medical History





- Social History


Smoking Status: Current Every Day Smoker


Family History: Reviewed & Not Pertinent





- Past Medical History


Cardiac Medical History: Reports: Hx Atrial Fibrillation


   Denies: Hx Pulmonary Embolism


Pulmonary Medical History: Reports: Hx Asthma, Hx COPD, Hx Pneumonia


   Denies: Hx Bronchitis, Hx Respiratory Failure, Hx Sleep Apnea, Hx 

Tuberculosis


Endocrine Medical History: Denies: Hx Graves' Disease


Renal/ Medical History: Denies: Hx End Stage Renal Disease, Hx Kidney Stones, 

Hx Ovarian Cysts, Hx Peritoneal Dialysis, Hx Pelvic Inflammatory Disease


Malignancy Medical History: Denies: Hx Breast Cancer, Hx Cervical Cancer, Hx 

Lung Cancer, Hx Ovarian Cancer


GI Medical History: Reports: Hx Gastroesophageal Reflux Disease, Hx Ulcer.  

Denies: Hx Crohn's Disease, Hx Hiatal Hernia, Hx Irritable Bowel, Hx Liver 

Failure, Hx Pancreatitis


Musculoskeltal Medical History: Reports Hx Arthritis - HANDS, Denies Hx 

Fibromyalgia, Denies Hx Muscular Dystrophy


Psychiatric Medical History: Reports: Hx Depression


   Denies: Hx Bipolar Disorder, Hx Post Traumatic Stress Disorder, Hx 

Schizophrenia


Traumatic Medical History: Denies: Hx Fractures


Past Surgical History: Reports: Hx Gynecologic Surgery - fibroid removal, Hx 

Hysterectomy, Hx Tubal Ligation.  Denies: Hx Appendectomy, Hx Bowel Surgery, Hx 

 Section, Hx Cholecystectomy, Hx Colostomy, Hx Coronary Artery Bypass 

Graft, Hx Gastric Bypass Surgery, Hx Herniorrhaphy, Hx Mastectomy, Hx Pacemaker

, Hx Tonsillectomy





- Immunizations


Hx Diphtheria, Pertussis, Tetanus Vaccination: Yes





Review of Systems





- Review of Systems


Constitutional: No symptoms reported


EENT: No symptoms reported


Cardiovascular: No symptoms reported


Respiratory: Short of breath, Wheezing


Gastrointestinal: No symptoms reported


Genitourinary: No symptoms reported


Female Genitourinary: No symptoms reported


Musculoskeletal: No symptoms reported


Skin: No symptoms reported


Neurological/Psychological: No symptoms reported


-: Yes All other systems reviewed and negative





Physical Exam





- Vital signs


Vitals: 


 











Temp Resp Pulse Ox


 


 98.0 F   24 H  98 


 


 07/10/18 21:43  07/10/18 21:43  07/10/18 21:43











Interpretation: Normal, Tachycardic, Tachypneic





- Notes


Notes: 





PHYSICAL EXAMINATION:





GENERAL: Well-appearing, well-nourished and in no acute distress.





HEAD: Atraumatic, normocephalic.





EYES: Pupils equal round and reactive to light, extraocular movements intact, 

conjunctiva are normal.





ENT: Nares patent, oropharynx clear without exudates.  Moist mucous membranes.





NECK: Normal range of motion, supple without lymphadenopathy





LUNGS: Diffuse wheezing





HEART: Tachycardic





ABDOMEN: Soft, nontender, nondistended abdomen.  No guarding, no rebound.  No 

masses appreciated.





Female : deferred





Musculoskeletal: Normal range of motion, no pitting or edema.  No cyanosis.





NEUROLOGICAL: Cranial nerves grossly intact.  Normal speech, normal gait.  

Normal sensory, motor exams





PSYCH: Normal mood, normal affect.





SKIN: Warm, Dry, normal turgor, no rashes or lesions noted.





Course





- Re-evaluation


Re-evalutation: 





18 02:09


On re-evaluation, Patient wants off the bipap. Says she's feeling better. 

Patient taken off of BiPAP.  Wheezing has significantly reduced.  Patient 

states that she is breathing easier.  Discussed admission versus discharge home 

with the patient.  Patient states that she wants to go home.  Patient is not on 

any home oxygen.  Patient was not placed on a nasal cannula.  Patient's oxygen 

saturation was monitored in the emergency department on room air.  She 

continues to have an oxygen saturation that ranges from 90-92%.  Patient's 

vital signs are currently blood pressure 110/79, heart rate 96, oxygen 

saturation 92% on room air, respiratory rate 14.  With the patient's history of 

COPD, her oxygen saturation may be chronically around this value.  Tachycardia 

has resolved. I will discharge the patient home with a prescription for 

prednisone. She's on nebulizer treatments. Chest xray did not show an acute 

process.  Patient instructed to take the medication prescribed as directed, to 

follow-up with her primary care physician this week, and to return to the 

emergency department for any worsening symptoms.  Patient is agreeable with the 

plan of care.











- Vital Signs


Vital signs: 


 











Temp Pulse Resp BP Pulse Ox


 


 98.0 F      18   100/78   96 


 


 07/10/18 21:43     18 00:36  18 00:36  18 00:36














- Laboratory


Result Diagrams: 


 07/10/18 21:48





 07/10/18 22:50


Laboratory results interpreted by me: 


 











  07/10/18 07/10/18





  21:48 22:50


 


RDW  14.6 H 


 


Plt Count  484 H 


 


Seg Neutrophils %  37.5 L 


 


Chloride   109 H


 


Glucose   130 H














- EKG Interpretation by Me


Additional EKG results interpreted by me: 





07/10/18 22:17


EKG: Ventricular rate 122, MO interval 152, QRS duration 68, QTc 433, sinus 

tachycardia.





Discharge





- Discharge


Clinical Impression: 


 COPD exacerbation





Condition: Good


Disposition: HOME, SELF-CARE


Instructions:  Chronic Obstructive Lung Disease ([facility])


Prescriptions: 


Prednisone [Deltasone 20 mg Tablet] 3 tab PO DAILY 5 Days #15 tablet


Referrals: 


LUIZ SAINI MD [Primary Care Provider] - Follow up as needed

## 2018-07-10 NOTE — RADIOLOGY REPORT (SQ)
EXAM DESCRIPTION:  CHEST SINGLE VIEW



COMPLETED DATE/TIME:  7/10/2018 10:02 pm



REASON FOR STUDY:  difficulty breathing



COMPARISON:  12/7/2017



EXAM PARAMETERS:  NUMBER OF VIEWS: One view.

TECHNIQUE: Single frontal radiographic view of the chest acquired.

RADIATION DOSE: NA

LIMITATIONS: None.



FINDINGS:  LUNGS AND PLEURA: Lungs are hyperexpanded.  There is no infiltrate or effusion.  There is 
no mass.

MEDIASTINUM AND HILAR STRUCTURES: No masses.  Contour normal.

HEART AND VASCULAR STRUCTURES: Heart normal in size.  Normal vasculature.

BONES: No acute findings.

HARDWARE: None in the chest.

OTHER: No other significant finding.



IMPRESSION:  Chronic lung changes with no acute cardiopulmonary disease.



TECHNICAL DOCUMENTATION:  JOB ID:  [id number]

 2011 [employer]- All Rights Reserved



Reading location - IP/workstation name: [email or web address]

## 2018-07-11 NOTE — EKG REPORT
SEVERITY:- ABNORMAL ECG -

SINUS TACHYCARDIA

AUDREY, CONSIDER BIATRIAL ABNORMALITIES

LOW VOLTAGE IN FRONTAL LEADS

BORDERLINE T ABNORMALITIES, ANT-LAT LEADS

:

Confirmed by: Amena Delaney MD 11-Jul-2018 21:38:38

## 2018-07-28 NOTE — ER DOCUMENT REPORT
ED General





- General


Chief Complaint: Respiratory Distress


Stated Complaint: SHORTNESS OF BREATH


Time Seen by Provider: 18 22:09


Mode of Arrival: Ambulatory


Information source: Patient


Notes: 





61-year-old female with COPD, asthma, tobacco use presents with complaint of 

shortness of breath that started this morning.  Patient denies associated cough 

but does state that she has chest tightness.  She states that she is is trying 

to quit smoking.  She denies any recent illness.  She denies chest pain, 

palpitation, fever, chills, nausea, vomiting.  Patient has required BiPAP and 

intubation in the past.  No recent admissions.


TRAVEL OUTSIDE OF THE [place] IN LAST 30 DAYS: No





- HPI


Onset: This morning


Onset/Duration: Gradual, Persistent, Worse


Quality of pain: Other - Chest tightness


Severity: Moderate


Associated symptoms: Shortness of breath


Exacerbated by: Walking, Coughing


Relieved by: Denies


Similar symptoms previously: Yes


Recently seen / treated by doctor: No





- Related Data


Allergies/Adverse Reactions: 


 





No Known Allergies Allergy (Verified 18 22:10)


 











Past Medical History





- General


Information source: Patient





- Social History


Smoking Status: Current Every Day Smoker


Cigarette use (# per day): Yes - 15


Smoking Education Provided: Yes - Patient was counseled regarding smoking 

cessation for 4 minutes


Frequency of alcohol use: None


Drug Abuse: None


Lives with: Family


Family History: Reviewed & Not Pertinent


Patient has suicidal ideation: No


Patient has homicidal ideation: No





- Medical History


Medical History: Negative





- Past Medical History


Cardiac Medical History: Reports: Hx Atrial Fibrillation


   Denies: Hx Pulmonary Embolism


Pulmonary Medical History: Reports: Hx Asthma, Hx COPD, Hx Pneumonia


   Denies: Hx Bronchitis, Hx Respiratory Failure, Hx Sleep Apnea, Hx 

Tuberculosis


Endocrine Medical History: Denies: Hx Graves' Disease


Renal/ Medical History: Denies: Hx End Stage Renal Disease, Hx Kidney Stones, 

Hx Ovarian Cysts, Hx Peritoneal Dialysis, Hx Pelvic Inflammatory Disease


Malignancy Medical History: Denies: Hx Breast Cancer, Hx Cervical Cancer, Hx 

Lung Cancer, Hx Ovarian Cancer


GI Medical History: Reports: Hx Gastroesophageal Reflux Disease, Hx Ulcer.  

Denies: Hx Crohn's Disease, Hx Hiatal Hernia, Hx Irritable Bowel, Hx Liver 

Failure, Hx Pancreatitis


Musculoskeletal Medical History: Reports Hx Arthritis - HANDS, Denies Hx 

Fibromyalgia, Denies Hx Muscular Dystrophy


Psychiatric Medical History: Reports: Hx Depression


   Denies: Hx Bipolar Disorder, Hx Post Traumatic Stress Disorder, Hx 

Schizophrenia


Traumatic Medical History: Denies: Hx Fractures


Past Surgical History: Reports: Hx Gynecologic Surgery - fibroid removal, Hx 

Hysterectomy, Hx Tubal Ligation.  Denies: Hx Appendectomy, Hx Bowel Surgery, Hx 

 Section, Hx Cholecystectomy, Hx Colostomy, Hx Coronary Artery Bypass 

Graft, Hx Gastric Bypass Surgery, Hx Herniorrhaphy, Hx Mastectomy, Hx Pacemaker

, Hx Tonsillectomy





- Immunizations


Hx Diphtheria, Pertussis, Tetanus Vaccination: Yes





Review of Systems





- Review of Systems


Notes: 





REVIEW OF SYSTEMS:


CONSTITUTIONAL :  Denies fever,  chills, or sweats.  Denies recent illness. 

Denies weight loss, recent hospitalizations. 


EENT: Denies visual changes, eye pain.    Denies nasal or sinus congestion or 

discharge.  Denies sore throat, oral lesions, difficulty swallowing.


CARDIOVASCULAR:  Denies chest pain.  Denies palpitations. Denies lower 

extremity edema.


RESPIRATORY:  Denies cough, cold, or chest congestion.  Denies shortness of 

breath, wheezing.


GASTROINTESTINAL:  Denies abdominal pain or distention.  Denies nausea, vomiting

, or diarrhea.  Denies blood in vomitus, stools, or per rectum.  Denies black, 

tarry stools.  Denies constipation.  


GENITOURINARY:  Denies difficulty urinating, painful urination,  frequency, 

blood in urine, or  vaginal discharge.


MUSCULOSKELETAL:  Denies back or neck pain or stiffness.  Denies joint pain or 

swelling.


SKIN:   Denies rash, lesions or sores.


HEMATOLOGIC :   Denies easy bruising or bleeding.


LYMPHATIC:  Denies swollen glands.


NEUROLOGICAL:  Denies confusion or altered mental status.  Denies passing out 

or loss of consciousness.  Denies dizziness or lightheadedness.  Denies 

headache.  Denies weakness or paralysis.  Denies problems difficulty with 

ambulation, slurred speech.  Denies sensory loss, numbness, or tingling.  

Denies seizures.


PSYCHIATRIC:  Denies anxiety or stress.  Denies depression, suicidal ideation, 

or homicidal ideation.  Denies visual or auditory hallucinations.








Physical Exam





- Vital signs


Vitals: 


 











Temp Pulse Resp BP Pulse Ox


 


 98.4 F   128 H  26 H  130/81 H  88 L


 


 18 21:39  18 21:39  18 21:39  18 21:39  18 21:39











Interpretation: Tachycardic, Hypoxic, Tachypneic





- Notes


Notes: 





PHYSICAL EXAMINATION:





GENERAL: Well-appearing, well-nourished and in no acute distress.





HEAD: Atraumatic, normocephalic.





EYES: Pupils equal round and reactive to light, extraocular movements intact, 

conjunctiva are normal.





ENT: Nares patent, oropharynx clear without exudates.  Moist mucous membranes.





NECK: Normal range of motion, supple without lymphadenopathy





LUNGS: Diminished breath sounds in all lung fields.  Accessory muscle use.  

Inability to speak in full sentences.





HEART: Regular rate and rhythm without murmurs





ABDOMEN: Soft, nontender, nondistended abdomen.  No guarding, no rebound.  No 

masses appreciated.





Female : deferred





Musculoskeletal: Normal range of motion, no pitting or edema.  No cyanosis.





NEUROLOGICAL: Cranial nerves grossly intact.  Normal speech, normal gait.  

Normal sensory, motor exams





PSYCH: Normal mood, normal affect.





SKIN: Warm, Dry, normal turgor, no rashes or lesions noted.





Course





- Re-evaluation


Re-evalutation: 








Laboratory











  18





  21:44 23:26 23:26


 


WBC  6.0  


 


RBC  4.84  


 


Hgb  14.7  


 


Hct  44.2  


 


MCV  91  


 


MCH  30.5  


 


MCHC  33.3  


 


RDW  14.3 H  


 


Plt Count  248  


 


Seg Neutrophils %  42.8  


 


Lymphocytes %  41.6  


 


Monocytes %  10.9  


 


Eosinophils %  3.7  


 


Basophils %  1.0  


 


Absolute Neutrophils  2.6  


 


Absolute Lymphocytes  2.5  


 


Absolute Monocytes  0.6  


 


Absolute Eosinophils  0.2  


 


Absolute Basophils  0.1  


 


VBG pH    7.30


 


VBG pCO2    50.0


 


VBG HCO3    24.0


 


VBG Base Excess    -2.9


 


Sodium   144.4 


 


Potassium   4.0 


 


Chloride   111 H 


 


Carbon Dioxide   23 


 


Anion Gap   10 


 


BUN   11 


 


Creatinine   0.69 


 


Est GFR ( Amer)   > 60 


 


Est GFR (Non-Af Amer)   > 60 


 


Glucose   150 H 


 


Calcium   9.7 














 





Chest X-Ray  18 22:06


IMPRESSION: 


 


No evidence of acute cardiopulmonary disease.


 


 








61-year-old female with COPD, asthma, tobacco use presents with complaint of 

shortness of breath that started this morning.  Patient denies associated cough 

but does state that she has chest tightness.  She states that she is is trying 

to quit smoking.  She denies any recent illness.  She denies chest pain, 

palpitation, fever, chills, nausea, vomiting.  Patient has required BiPAP and 

intubation in the past.  No recent admissions.  Patient was seen by myself upon 

arrival.  Vital signs were reviewed. Patient is afebrile, hypertensive, hypoxic

, tachypneic.  Patient does not appear toxic or dehydrated, he is in 

significant respiratory distress. Previous medical records and nursing notes 

reviewed.  Significant findings include diminished breath sounds in all lung 

fields.  CBC is without leukocytosis in his blood glass within normal limits, 

CMP also within normal limits except for mildly elevated glucose.  Patient was 

provided multiple breathing treatments, magnesium, Solu-Medrol.


18 23:14


Patient placed on BiPAP after no improvement with breathing treatments and 

magnesium.  I did discuss admission with the patient who states she does not 

want to be admitted.


18 00:41


On reevaluation patient states that she feels much better.  She was ambulated 

on pulse ox and desaturated to 86% and her heart rate went up to 135.  I 

discussed admission with the patient again who is declining at this time.  She 

is alert, awake and capable of making her own decisions.  Patient provided the 

opportunity to ask questions, and express concerns.  Discharge instructions 

discussed. Patient is agreeable with discharge home.  Return indications 

explained and discussed with the patient who displays understanding.  Patient 

encouraged to return to the emergency department immediately with any concerns.


18 05:31








- Vital Signs


Vital signs: 


 











Temp Pulse Resp BP Pulse Ox


 


 98.4 F   112 H  19   122/82   93 


 


 18 21:39  18 00:00  18 00:00  18 00:00  18 00:00














- Laboratory


Result Diagrams: 


 18 21:44





 18 23:26


Laboratory results interpreted by me: 


 











  18





  21:44 23:26


 


RDW  14.3 H 


 


Chloride   111 H


 


Glucose   150 H














- Diagnostic Test


Radiology reviewed: Image reviewed, Reports reviewed





Discharge





- Discharge


Clinical Impression: 


 COPD exacerbation, Tobacco abuse, Respiratory distress, Hypoxia, Tachycardia





Condition: Good


Disposition: AGAINST MEDICAL ADVICE


Instructions:  Asthma ([facility]), Chronic Obstructive Lung Disease ([facility]), Stop 

Smoking ([facility])


Prescriptions: 


Prednisone [Deltasone 20 mg Tablet] 3 tab PO DAILY 5 Days #15 tablet


Forms:  Smoking Cessation Education


Referrals: 


LUIZ SAINI MD [Primary Care Provider] - Follow up in 3-5 days

## 2018-07-28 NOTE — RADIOLOGY REPORT (SQ)
EXAM DESCRIPTION: 



XR CHEST 1 VIEW



COMPLETED DATE/TME:  07/28/2018 22:06



CLINICAL HISTORY: 



61 years, Female, SOB; O2 88% room air; hx asthma

EXAM DESCRIPTION: 







CLINICAL HISTORY: 



SOB; O2 88% room air; hx asthma



COMPARISON: 



7/10/2018



FINDINGS: 



Single view of the chest is submitted. 



Cardiac silhouette is normal.



No focal parenchymal or pleural disease.



 No acute bony abnormality.



 There is no significant pulmonary vascular engorgement.



IMPRESSION: 



No evidence of acute cardiopulmonary disease.

## 2018-08-25 NOTE — RADIOLOGY REPORT (SQ)
EXAM DESCRIPTION:  CHEST 2 VIEWS



COMPLETED DATE/TIME:  8/25/2018 8:42 pm



REASON FOR STUDY:  short of [facility]



COMPARISON:  9/30/2016



NUMBER OF VIEWS:  Two view.



TECHNIQUE:  Frontal and lateral radiographic views of the chest acquired.



LIMITATIONS:  None.



FINDINGS:  LUNGS AND PLEURA: No opacities, masses or pneumothorax. No pleural effusion. Attenuated bl
ood vessels and flattened janny-diaphragms.

MEDIASTINUM AND HILAR STRUCTURES: No masses.  No contour abnormalities.

HEART AND VASCULAR STRUCTURES: Heart normal in size and contour.  No evidence for failure.

BONES: Right proximal humor surgical anchor and epigastric surgical clips, unchanged.

HARDWARE: None in the chest.

OTHER: No other significant finding.



IMPRESSION:  COPD.  NO ACUTE RADIOGRAPHIC FINDING IN THE CHEST.



TECHNICAL DOCUMENTATION:  JOB ID:  [id number]

 2011 [employer]- All Rights Reserved



Reading location - IP/workstation name: [email or web address]

## 2018-08-25 NOTE — ER DOCUMENT REPORT
ED Medical Screen (RME)





- General


Chief Complaint: Shortness Of Breath


Stated Complaint: DIFFICULTY BREATHING, NAUSEA


Time Seen by Provider: 18 19:55


Mode of Arrival: Medic


Information source: Patient, Relative


TRAVEL OUTSIDE OF THE [place] IN LAST 30 DAYS: No





- HPI


Patient complains to provider of: Dizziness lightheadedness


Onset: Other - 61-year-old female presents for evaluation of dizziness 

lightheadedness persistent nausea and vomiting who started feeling bad around 5 

hours prior to arrival, she has known COPD as well as hypertension has had some 

shortness of breath as well.  She notes that every time she sits up she vomits. 

She denies any abdominal pain but does note some palpitations shortness of 

breath lightheadedness.  She never had a stroke in the past, she may have had 

something in her heart which is uncertain.





- Related Data


Allergies/Adverse Reactions: 


 





No Known Allergies Allergy (Verified 18 18:43)


 











Past Medical History





- Social History


Chew tobacco use (# tins/day): No


Frequency of alcohol use: None


Drug Abuse: None





- Past Medical History


Cardiac Medical History: Reports: Hx Atrial Fibrillation


   Denies: Hx Pulmonary Embolism


Pulmonary Medical History: Reports: Hx Asthma, Hx COPD, Hx Pneumonia


   Denies: Hx Bronchitis, Hx Respiratory Failure, Hx Sleep Apnea, Hx 

Tuberculosis


Endocrine Medical History: Denies: Hx Graves' Disease


Renal/ Medical History: Denies: Hx End Stage Renal Disease, Hx Kidney Stones, 

Hx Ovarian Cysts, Hx Peritoneal Dialysis, Hx Pelvic Inflammatory Disease


Malignancy Medical History: Denies: Hx Breast Cancer, Hx Cervical Cancer, Hx 

Lung Cancer, Hx Ovarian Cancer


GI Medical History: Reports: Hx Gastroesophageal Reflux Disease, Hx Ulcer.  

Denies: Hx Crohn's Disease, Hx Hiatal Hernia, Hx Irritable Bowel, Hx Liver 

Failure, Hx Pancreatitis


Musculoskeltal Medical History: Reports Hx Arthritis - HANDS, Denies Hx 

Fibromyalgia, Denies Hx Muscular Dystrophy


Psychiatric Medical History: Reports: Hx Depression


   Denies: Hx Bipolar Disorder, Hx Post Traumatic Stress Disorder, Hx 

Schizophrenia


Traumatic Medical History: Denies: Hx Fractures


Past Surgical History: Reports: Hx Gynecologic Surgery - fibroid removal, Hx 

Hysterectomy, Hx Tubal Ligation.  Denies: Hx Appendectomy, Hx Bowel Surgery, Hx 

 Section, Hx Cholecystectomy, Hx Colostomy, Hx Coronary Artery Bypass 

Graft, Hx Gastric Bypass Surgery, Hx Herniorrhaphy, Hx Mastectomy, Hx Pacemaker

, Hx Tonsillectomy





- Immunizations


Hx Diphtheria, Pertussis, Tetanus Vaccination: Yes





Physical Exam





- Vital signs


Vitals: 





 











Temp Pulse Resp BP Pulse Ox


 


 98.1 F   102 H  20   138/93 H  98 


 


 18 19:08  18 19:08  18 19:08  18 19:08  18 19:08














Course





- Re-evaluation


Re-evalutation: 





18 19:58


61-year-old female with a mixed complaint, initially with shortness of breath 

seen by EMS received steroids as well as breathing treatment.


She notes that she feels dizzy and has persistent vomiting every time she sits 

up with her head not lay down.


She never had anything like this in the past denies any history of vertigo.


She notes that she is having some palpitations and some shortness of breath and 

chest pain.


Given the fact that she is got a mixed picture will defer further imaging at 

this time will administer meclizine to help with potential vertigo type 

symptoms.


Patient to be evaluated through the main side of the emergency department.





- Vital Signs


Vital signs: 





 











Temp Pulse Resp BP Pulse Ox


 


 98.1 F   102 H  20   138/93 H  98 


 


 18 19:08  18 19:08  18 19:08  18 19:08  18 19:08














Doctor's Discharge





- Discharge


Referrals: 


LUIZ SAINI MD [Primary Care Provider] - Follow up as needed

## 2018-08-25 NOTE — ER DOCUMENT REPORT
ED General





- General


Chief Complaint: Shortness Of Breath


Stated Complaint: DIFFICULTY BREATHING, NAUSEA


Time Seen by Provider: 18 19:55


Mode of Arrival: Medic


Notes: 


Patient is a 61 year old female that comes to the ED by EMS for complaints of 

shortness of breath, wheezing, and chest tightness that worsened today, along 

with symptoms of dizziness and spinning sensation along with vomiting that 

started 5 hours ago.  She states that when she sits up and turns her head she 

first starts feeling dizzy and nauseated.  She was given Zofran, Solu-Medrol, 

breathing treatment by EMS and she states she feels somewhat better but not 

completely resolved.  She states she thinks she might of had a fever at home 

but she is unsure.  No particular areas of abdominal pain.  She smokes, past 

medical history of COPD with inhalers at home, no oxygen at home.  Remaining 

past medical history includes hypertension, surgery for perforated ulcer, 

denies MI or stroke, denies medical history otherwise.








TRAVEL OUTSIDE OF THE [place] IN LAST 30 DAYS: No





- Related Data


Allergies/Adverse Reactions: 


 





No Known Allergies Allergy (Verified 18 18:43)


 











Past Medical History





- General


Information source: Patient, Relative





- Social History


Smoking Status: Current Every Day Smoker


Chew tobacco use (# tins/day): No


Frequency of alcohol use: None


Drug Abuse: None


Lives with: Family


Family History: Reviewed & Not Pertinent


Patient has suicidal ideation: No


Patient has homicidal ideation: No





- Past Medical History


Cardiac Medical History: Reports: Hx Atrial Fibrillation


   Denies: Hx Pulmonary Embolism


Pulmonary Medical History: Reports: Hx Asthma, Hx COPD, Hx Pneumonia


   Denies: Hx Bronchitis, Hx Respiratory Failure, Hx Sleep Apnea, Hx 

Tuberculosis


Endocrine Medical History: Denies: Hx Graves' Disease


Renal/ Medical History: Denies: Hx End Stage Renal Disease, Hx Kidney Stones, 

Hx Ovarian Cysts, Hx Peritoneal Dialysis, Hx Pelvic Inflammatory Disease


Malignancy Medical History: Denies: Hx Breast Cancer, Hx Cervical Cancer, Hx 

Lung Cancer, Hx Ovarian Cancer


GI Medical History: Reports: Hx Gastroesophageal Reflux Disease, Hx Ulcer.  

Denies: Hx Crohn's Disease, Hx Hiatal Hernia, Hx Irritable Bowel, Hx Liver 

Failure, Hx Pancreatitis


Musculoskeletal Medical History: Reports Hx Arthritis - HANDS, Denies Hx 

Fibromyalgia, Denies Hx Muscular Dystrophy


Psychiatric Medical History: Reports: Hx Depression


   Denies: Hx Bipolar Disorder, Hx Post Traumatic Stress Disorder, Hx 

Schizophrenia


Traumatic Medical History: Denies: Hx Fractures


Past Surgical History: Reports: Hx Gynecologic Surgery - fibroid removal, Hx 

Hysterectomy, Hx Tubal Ligation.  Denies: Hx Appendectomy, Hx Bowel Surgery, Hx 

 Section, Hx Cholecystectomy, Hx Colostomy, Hx Coronary Artery Bypass 

Graft, Hx Gastric Bypass Surgery, Hx Herniorrhaphy, Hx Mastectomy, Hx Pacemaker

, Hx Tonsillectomy





- Immunizations


Hx Diphtheria, Pertussis, Tetanus Vaccination: Yes





Review of Systems





- Review of Systems


Constitutional: See HPI


EENT: No symptoms reported


Cardiovascular: See HPI


Respiratory: See HPI


Gastrointestinal: See HPI


Genitourinary: No symptoms reported


Female Genitourinary: No symptoms reported


Musculoskeletal: No symptoms reported


Skin: No symptoms reported


Hematologic/Lymphatic: No symptoms reported


Neurological/Psychological: See HPI





Physical Exam





- Vital signs


Vitals: 


 











Temp Pulse Resp BP Pulse Ox


 


 98.1 F   102 H  20   138/93 H  98 


 


 18 19:08  18 19:08  18 19:08  18 19:08  18 19:08














- Notes


Notes: 





GENERAL: Alert, interacts well. No acute distress.


HEAD: Normocephalic, atraumatic.


EYES: Pupils equal, round, and reactive to light. Extraocular movements intact.

  Questionable minimal horizontal nystagmus to the right.


ENT: Oral mucosa moist, tongue midline.  Normal oropharyngeal exam.


NECK: Full range of motion. Supple. Trachea midline.


LUNGS: Decreased breath sounds bilaterally with expiratory wheezes throughout.  

No noted rales or rhonchi.  No tachypnea or labored breathing.


HEART: Borderline tachycardic, no murmur, no extrasystoles noted.


ABDOMEN: Soft, non-tender. Non-distended. Bowel sounds present in all 4 

quadrants.


EXTREMITIES: Moves all 4 extremities spontaneously. No edema, normal radial and 

dorsalis pedis pulses bilaterally. No cyanosis.


BACK: no cervical, thoracic, lumbar midline tenderness. No saddle anesthesia, 

normal distal neurovascular exam. 


NEUROLOGICAL: Alert and oriented x3. Normal speech. [cranial nerves II through 

XII grossly intact]. 


PSYCH: Normal affect, normal mood.


SKIN: Warm, dry, normal turgor. No rashes or lesions noted.








Course





- Re-evaluation


Re-evalutation: 


EKG showing sinus rhythm at a rate of 96, no T-wave inversions or ST segment 

changes in consecutive leads.  Borderline Q waves in the inferior leads.  No 

significant change from prior.  Chest x-ray shows COPD but no acute findings.





CBC generally unremarkable, chemistry nonspecific, troponin is not elevated.





18 21:35


Patient was given magnesium, additional DuoNebs.  On reevaluation patient 

significantly improved.  Wheezing almost resolved, tightness in her chest 

resolved, denies dizziness after meclizine.  Smiling and states she feels much 

improved.





Patient does not have tachypnea now, she appears much improved, minimal 

wheezing now, patient got up and ambulated without difficulty, she ambulated 

without pulse oxygen desaturation.  Because of her symptoms I did discuss 

possible imaging of the head and even possible admission for her dizziness and 

COPD exacerbation but patient states that she feels good and she would like to 

leave.  She states that she will follow closely with her doctor and 

pulmonologist and return if she worsens.  I discussed this with family.  

Discharged with return precautions.





- Vital Signs


Vital signs: 


 











Temp Pulse Resp BP Pulse Ox


 


 97.8 F   109 H  19   119/88 H  94 


 


 18 00:18  18 20:17  18 00:29  18 00:30  18 00:29














- Laboratory


Result Diagrams: 


 18 20:00





 18 20:00


Laboratory results interpreted by me: 


 











  18





  20:00 20:00


 


Hgb  15.6 H 


 


RDW  14.3 H 


 


Seg Neutrophils %  81.1 H 


 


Lymphocytes %  12.5 L 


 


Glucose   125 H


 


AST   37 H














Discharge





- Discharge


Clinical Impression: 


 Wheezing, COPD exacerbation, Dizziness





Vomiting


Qualifiers:


 Vomiting type: unspecified Vomiting Intractability: non-intractable Nausea 

presence: with nausea Qualified Code(s): R11.2 - Nausea with vomiting, 

unspecified





Condition: Stable


Disposition: HOME, SELF-CARE


Additional Instructions: 


You have been treated for COPD exacerbation here, continue home inhalers, take 

prednisone as prescribed, use your home nebulizer.  Follow-up with your 

pulmonologist.





Your evaluation and symptoms, along with your response to treatment suggest 

labyrinthitis (inner ear abnormality that is temporary causing vertigo, nausea, 

etc.).  Take meclizine as prescribed for this.  Follow-up with your primary 

care within the next 2-3 days.





Return if you worsen including fever, difficulty breathing, passing out, 

worsening dizziness, returned or uncontrolled vomiting, or any other concerning 

or worsening symptoms.


Prescriptions: 


Meclizine HCl [Bonine] 25 mg PO TID #24 tab.chew


Prednisone 60 mg PO DAILY #15 tablet


Referrals: 


LUIZ SAINI MD [ACTIVE STAFF] - Follow up as needed

## 2018-08-25 NOTE — EKG REPORT
SEVERITY:- BORDERLINE ECG -

SINUS RHYTHM

LOW VOLTAGE IN FRONTAL LEADS

CONSIDER INFERIOR INFARCT

:

Confirmed by: Cody Fowler 25-Aug-2018 20:45:44

## 2018-10-12 NOTE — ER DOCUMENT REPORT
ED General





- General


Chief Complaint: Flank Pain


Stated Complaint: LEFT SIDE PAIN/WHEEZING


Time Seen by Provider: 10/12/18 18:15


Mode of Arrival: Ambulatory


Notes: 





Patient is a 61-year-old female with a past medical history of COPD who 

presents with 2 distinct complaints.  Her first and chief complaint at the time 

of my assessment is of postprandial fullness, heaviness in her upper abdomen 

with associated nausea and vomiting.  The patient states that the pain 

sometimes wraps around to the left upper abdomen and left upper back.  She 

states that she has seen her general doctor regarding this issue, was 

prescribed medication for her stomach but did not start this medication.  She 

states that food does seem to worsen the symptoms.  She has a past surgical 

history of an appendectomy and colonic mass resection.  The patient was 

complaining of shortness of breath and a COPD exacerbation in triage but states 

that her symptoms have resolved after receiving a nebulizer. she denies any 

associated chest pain, pleuritic pain, fever or constitutional symptoms.  She 

has not seen her general doctor regarding today's concerns. 


TRAVEL OUTSIDE OF THE [place] IN LAST 30 DAYS: No





- Related Data


Allergies/Adverse Reactions: 


 





No Known Allergies Allergy (Verified 18 18:43)


 











Past Medical History





- General


Information source: Patient





- Social History


Smoking Status: Current Some Day Smoker


Chew tobacco use (# tins/day): No


Frequency of alcohol use: None


Drug Abuse: None


Lives with: Family


Family History: Reviewed & Not Pertinent


Patient has suicidal ideation: No


Patient has homicidal ideation: No





- Past Medical History


Cardiac Medical History: Reports: Hx Atrial Fibrillation


   Denies: Hx Pulmonary Embolism


Pulmonary Medical History: Reports: Hx Asthma, Hx COPD, Hx Pneumonia


   Denies: Hx Bronchitis, Hx Respiratory Failure, Hx Sleep Apnea, Hx 

Tuberculosis


Endocrine Medical History: Denies: Hx Graves' Disease


Renal/ Medical History: Denies: Hx End Stage Renal Disease, Hx Kidney Stones, 

Hx Ovarian Cysts, Hx Peritoneal Dialysis, Hx Pelvic Inflammatory Disease


Malignancy Medical History: Denies: Hx Breast Cancer, Hx Cervical Cancer, Hx 

Lung Cancer, Hx Ovarian Cancer


GI Medical History: Reports: Hx Gastroesophageal Reflux Disease, Hx Ulcer.  

Denies: Hx Crohn's Disease, Hx Hiatal Hernia, Hx Irritable Bowel, Hx Liver 

Failure, Hx Pancreatitis


Musculoskeletal Medical History: Reports Hx Arthritis - HANDS, Denies Hx 

Fibromyalgia, Denies Hx Muscular Dystrophy


Psychiatric Medical History: Reports: Hx Depression


   Denies: Hx Bipolar Disorder, Hx Post Traumatic Stress Disorder, Hx 

Schizophrenia


Traumatic Medical History: Denies: Hx Fractures


Past Surgical History: Reports: Hx Abdominal Surgery, Hx Gynecologic Surgery - 

fibroid removal, Hx Hysterectomy, Hx Tubal Ligation.  Denies: Hx Appendectomy, 

Hx Bowel Surgery, Hx  Section, Hx Cholecystectomy, Hx Colostomy, Hx 

Coronary Artery Bypass Graft, Hx Gastric Bypass Surgery, Hx Herniorrhaphy, Hx 

Mastectomy, Hx Pacemaker, Hx Tonsillectomy





- Immunizations


Hx Diphtheria, Pertussis, Tetanus Vaccination: Yes





Review of Systems





- Review of Systems


Notes: 





Constitutional: Negative for fever.


HENT: Negative for sore throat.


Eyes: Negative for visual changes.


Cardiovascular: Negative for chest pain.


Respiratory: Positive for shortness of breath now resolved


Gastrointestinal: Positive for abdominal pain and vomiting


Genitourinary: Negative for dysuria.


Musculoskeletal: Negative for back pain.


Skin: Negative for rash.


Neurological: Negative for headaches, weakness or numbness.





10 point ROS negative except as marked above and in HPI.





Physical Exam





- Vital signs


Vitals: 


 











Temp Pulse Resp BP Pulse Ox


 


 98.4 F   126 H  28 H  111/75   95 


 


 10/12/18 17:32  10/12/18 17:32  10/12/18 17:32  10/12/18 17:32  10/12/18 17:32











Interpretation: Tachycardic - Resolved at the time my assessment, Tachypneic - 

Resolved at the time my assessment


Notes: 





PHYSICAL EXAMINATION:





GENERAL: Well-appearing, well-nourished and in no acute distress.





HEAD: Atraumatic, normocephalic.





EYES: Pupils equal round and reactive to light, extraocular movements intact, 

sclera anicteric, conjunctiva are normal.





ENT: nares patent, oropharynx clear without exudates.  Moist mucous membranes.





NECK: Normal range of motion, supple without lymphadenopathy





LUNGS: Breath sounds clear to auscultation bilaterally and equal.  No wheezes 

rales or rhonchi.





HEART: Regular rate and rhythm without murmurs





ABDOMEN: Soft, nontender, normoactive bowel sounds.  No guarding, no rebound.  

No masses appreciated.





EXTREMITIES: Normal range of motion, no pitting or edema.  No cyanosis.





NEUROLOGICAL: No focal neurological deficits. Moves all extremities 

spontaneously and on command.





PSYCH: Normal mood, normal affect.





SKIN: Warm, Dry, normal turgor, no rashes or lesions noted.





Course





- Re-evaluation


Re-evalutation: 





10/12/18 22:36


Patient presents with epigastric abdominal pain with associated reflux symptoms 

most consistent with likely gastritis.  Patient has no focal abdominal 

tenderness on examination.  Symptoms, lab and assessment are inconsistent with 

biliary pathology, no indication for right upper quadrant ultrasound at this 

point.  Lipase is normal.  No LFT changes.  Based on history and exam, I do not 

suspect ACS, pulmonary embolus, SBO, mesenteric ischemia, acute pancreatitis, 

biliary pathology, or an abdominal aortic dissection.  Patient has had 

improvement of symptoms here with a GI cocktail.  X-ray without any evidence of 

obstruction.  Patient did have some wheezing in triage apparently, nebulizer 

was given and this did resolve the patient's wheezing.  She has no wheezing at 

the time of my assessment.  At this time will discharge with return precautions 

and follow-up recommendations.  Verbal discharge instructions given a the 

bedside and opportunity for questions given. Medication warnings reviewed. 

Patient is in agreement with this plan and has verbalized understanding of 

return precautions and the need for primary care follow-up in the next 24-72 

hours.





- Vital Signs


Vital signs: 


 











Temp Pulse Resp BP Pulse Ox


 


 98.7 F   95   16   122/86 H  95 


 


 10/12/18 22:37  10/12/18 22:37  10/12/18 22:37  10/12/18 22:37  10/12/18 22:37














- Laboratory


Result Diagrams: 


 10/12/18 18:39





 10/12/18 18:39


Laboratory results interpreted by me: 


 











  10/12/18 10/12/18





  18:39 18:40


 


RBC  5.30 H 


 


Hgb  16.4 H 


 


Hct  48.1 H 


 


Urine Ketones   TRACE H


 


Urine Urobilinogen   4.0 H














- Diagnostic Test


Radiology reviewed: Reports reviewed





Discharge





- Discharge


Clinical Impression: 


 Abdominal discomfort, epigastric, Wheezing





Nausea and vomiting


Qualifiers:


 Vomiting type: unspecified Vomiting Intractability: non-intractable Qualified 

Code(s): R11.2 - Nausea with vomiting, unspecified





Condition: Good


Disposition: HOME, SELF-CARE


Additional Instructions: 


Your symptoms appear to be most consistent with stomach or upper intestinal 

irritation.   Please begin taking famotidine 40 mg in the morning and 40 mg at 

night.  This medicine can be purchased directly over-the-counter.  You may also 

take medicine such as Pepto-Bismol or Tums to assist with your pain.  Please 

return to emergency department immediately if you have worsening of your pain, 

shortness of breath, vomiting, become unable to exert yourself due to pain or 

difficulty breathing, you pass out, or have any pain that radiates into your 

arms, jaw, or back. Please also return if you have any additional symptoms that 

are concerning to you.





As we have discussed, the most important thing is lifestyle changes.  You need 

to avoid smoking, sodas, tea, coffee, alcohol, spicy foods, and acidic foods 

such as citrus fruits, tomato based products, berries, and most fruit juices. 


Prescriptions: 


Famotidine 40 mg PO BID #60 tablet


Prednisone [Deltasone 20 mg Tablet] 3 tab PO DAILY 5 Days  tablet


Sucralfate [Carafate 1 gm Tablet] 1 gm PO ACHS #120 tablet


Referrals: 


BRANDY PARRISH MD [Primary Care Provider] - Follow up as needed

## 2018-10-12 NOTE — RADIOLOGY REPORT (SQ)
EXAM DESCRIPTION:  ACUTE ABDOMEN SERIES



COMPLETED DATE/TIME:  10/12/2018 6:37 pm



REASON FOR STUDY:  abd pain



COMPARISON:  None.



NUMBER OF VIEWS:  Three views.



TECHNIQUE:  Frontal chest, supine abdomen and upright/decubitus abdomen radiographic images acquired.




LIMITATIONS:  None.



FINDINGS:  CHEST: Lungs clear of infiltrates.

FREE AIR: None. No abnormal gas collections.

BOWEL GAS PATTERN: Nonobstructive pattern. No air fluid levels.

Large amount of material in the upper abdomen and left upper quadrant, possible stomach full of inges
flip content versus large amount of stool in the transverse colon.

CALCIFICATIONS: No suspicious calcifications.

HARDWARE: None in the abdomen.

SOFT TISSUES: No gross mass or suggestion of organomegaly.

BONES: No acute fracture. No worrisome bone lesions.

OTHER: No other significant finding.



IMPRESSION:   Nonobstructive pattern. No air fluid levels.Large amount of material in the upper abdom
en and left upper quadrant, possible stomach full of ingested contents versus large amount of stool i
n the transverse colon.



TECHNICAL DOCUMENTATION:  JOB ID:  [id number]

[id number]

 2011 [employer]- All Rights Reserved



Reading location - IP/workstation name: [employer]

## 2018-10-12 NOTE — ER DOCUMENT REPORT
ED Medical Screen (RME)





- General


Chief Complaint: Flank Pain


Stated Complaint: LEFT SIDE PAIN/WHEEZING


Time Seen by Provider: 10/12/18 18:15


Mode of Arrival: Ambulatory


Information source: Patient


TRAVEL OUTSIDE OF THE [place] IN LAST 30 DAYS: No





- HPI


Patient complains to provider of: abd pain; wheezing


Onset: Yesterday - pt with L-SIDED ABD PAIN and intermittent wheezing for the 

past 2-3 days





- Related Data


Allergies/Adverse Reactions: 


 





No Known Allergies Allergy (Verified 18 18:43)


 











Past Medical History





- Social History


Chew tobacco use (# tins/day): No


Frequency of alcohol use: None


Drug Abuse: None





- Past Medical History


Cardiac Medical History: Reports: Hx Atrial Fibrillation


   Denies: Hx Pulmonary Embolism


Pulmonary Medical History: Reports: Hx Asthma, Hx COPD, Hx Pneumonia


   Denies: Hx Bronchitis, Hx Respiratory Failure, Hx Sleep Apnea, Hx 

Tuberculosis


Endocrine Medical History: Denies: Hx Graves' Disease


Renal/ Medical History: Denies: Hx End Stage Renal Disease, Hx Kidney Stones, 

Hx Ovarian Cysts, Hx Peritoneal Dialysis, Hx Pelvic Inflammatory Disease


Malignancy Medical History: Denies: Hx Breast Cancer, Hx Cervical Cancer, Hx 

Lung Cancer, Hx Ovarian Cancer


GI Medical History: Reports: Hx Gastroesophageal Reflux Disease, Hx Ulcer.  

Denies: Hx Crohn's Disease, Hx Hiatal Hernia, Hx Irritable Bowel, Hx Liver 

Failure, Hx Pancreatitis


Musculoskeltal Medical History: Reports Hx Arthritis - HANDS, Denies Hx 

Fibromyalgia, Denies Hx Muscular Dystrophy


Psychiatric Medical History: Reports: Hx Depression


   Denies: Hx Bipolar Disorder, Hx Post Traumatic Stress Disorder, Hx 

Schizophrenia


Traumatic Medical History: Denies: Hx Fractures


Past Surgical History: Reports: Hx Abdominal Surgery, Hx Gynecologic Surgery - 

fibroid removal, Hx Hysterectomy, Hx Tubal Ligation.  Denies: Hx Appendectomy, 

Hx Bowel Surgery, Hx  Section, Hx Cholecystectomy, Hx Colostomy, Hx 

Coronary Artery Bypass Graft, Hx Gastric Bypass Surgery, Hx Herniorrhaphy, Hx 

Mastectomy, Hx Pacemaker, Hx Tonsillectomy





- Immunizations


Hx Diphtheria, Pertussis, Tetanus Vaccination: Yes





Physical Exam





- Vital signs


Vitals: 





 











Temp Pulse Resp BP Pulse Ox


 


 98.4 F   126 H  28 H  111/75   95 


 


 10/12/18 17:32  10/12/18 17:32  10/12/18 17:32  10/12/18 17:32  10/12/18 17:32














Course





- Vital Signs


Vital signs: 





 











Temp Pulse Resp BP Pulse Ox


 


 98.4 F   126 H  28 H  111/75   95 


 


 10/12/18 17:32  10/12/18 17:32  10/12/18 17:32  10/12/18 17:32  10/12/18 17:32














Doctor's Discharge





- Discharge


Referrals: 


BRANDY PARRISH MD [Primary Care Provider] - Follow up as needed

## 2018-10-16 NOTE — RADIOLOGY REPORT (SQ)
EXAM DESCRIPTION:  ACUTE ABDOMEN SERIES



COMPLETED DATE/TIME:  10/16/2018 8:01 pm



REASON FOR STUDY:  abdominal pain



COMPARISON:  None.



NUMBER OF VIEWS:  Three views.



TECHNIQUE:  Frontal chest, supine abdomen and upright/decubitus abdomen radiographic images acquired.




LIMITATIONS:  None.



FINDINGS:  CHEST: Lungs clear of infiltrates.

FREE AIR: None. No abnormal gas collections.

BOWEL GAS PATTERN: Nonobstructive pattern.  The stomach appears to be distended with considerable con
tents.

CALCIFICATIONS: No suspicious calcifications.

HARDWARE: None in the abdomen.

SOFT TISSUES: No gross mass or suggestion of organomegaly.

BONES: No acute fracture.  No worrisome bone lesions.

OTHER: No other significant finding.



IMPRESSION:  Distended stomach.  Nonspecific abdomen.



TECHNICAL DOCUMENTATION:  JOB ID:  [id number]

 2011 [employer]- All Rights Reserved



Reading location - IP/workstation name: [email or web address]

## 2018-10-16 NOTE — RADIOLOGY REPORT (SQ)
EXAM DESCRIPTION: 



CT ABDOMEN PELVIS WITH IV CONTRAST



COMPLETED DATE/TME:  10/16/2018 00:00



CLINICAL HISTORY: Left flank pain.



COMPARISON: None Available.



TECHNIQUE: CT of the abdomen and pelvis performed following IV

administration of iodinated contrast.



DLP: 747.64 mGycm



FINDINGS: 



Lung Bases: The visualized  lung bases are clear.



Bones: No destructive bone lesions identified.



Abdomen:



Liver: The liver has normal size and density. No intrahepatic

mass or biliary dilatation. 

Gallbladder: No calcified gallstones.

Spleen, Pancreas, and Adrenal Glands:  The spleen, pancreas, and

adrenal glands are unremarkable.

Kidneys:  The kidneys have normal size and contour without

evidence of solid mass or hydronephrosis.  

Vasculature: The aorta and IVC have normal caliber and position. 

The portal vein is patent. The proximal visceral and renal

arteries are patent. 

Stomach:  Diffuse gastric distention without definite obstructing

lesion identified. Mild fat stranding adjacent to the pylorus.

Postoperative change at the gastroesophageal junction. 

Other:  No free intraperitoneal air.  No free fluid or

lymphadenopathy. 



Pelvis:



Bladder:  Urinary bladder is unremarkable. 

Bowel:  Surgical findings suggest prior partial colectomy. No

dilated loops of large or small bowel.

Appendix:  No identified and likely surgically absent.

Pelvis: Uterus is not enlarged.





IMPRESSION: 



1. Severe gastric distention of indeterminate etiology. No

definite obstructing lesion identified. There is mild

inflammatory change near the pylorus. These findings are

nonspecific. Differential considerations include gastric/duodenal

ulcer or gastritis, gastroparesis, gastric carcinoma, and

abnormalities related to postoperative change. Correlation with

endoscopy or upper GI could provide additional characterization.



This exam was performed according to our departmental

dose-optimization program, which includes automated exposure

control, adjustment of the mA and/or kV according to patient size

and/or use of iterative reconstruction technique.

## 2018-10-16 NOTE — ER DOCUMENT REPORT
ED Medical Screen (RME)





- General


Chief Complaint: Abdominal Pain


Stated Complaint: SIDE PAIN


Time Seen by Provider: 10/16/18 19:18


Mode of Arrival: Ambulatory


Information source: Patient


Notes: 





61-year-old female presents the emergency department complaints of left flank 

pain and abdominal distention.  Patient states that she was here on Saturday 

and diagnosed with gastritis. She says that this has not resolved. Patient 

states that she has also been constipated for the last 3 days.  She states that 

she is passing gas.  She states that she has been taking mag citrate and milk 

of magnesia without any relief of the constipation.  Patient states that she is 

having intermittent nausea but denies vomiting.





I have greeted and performed a rapid initial assessment of this patient.  A 

comprehensive ED assessment and evaluation of the patient, analysis of test 

results and completion of the medical decision making process will be conducted 

by additional ED providers.





PHYSICAL EXAMINATION:





GENERAL: Well-appearing, well-nourished and in no acute distress.





HEAD: Atraumatic, normocephalic.





EYES: Pupils equal round extraocular movements intact,  conjunctiva are normal.





ENT: Nares patent





NECK: Normal range of motion





LUNGS: No respiratory distress





Musculoskeletal: Normal range of motion





NEUROLOGICAL:  Normal speech, normal gait. 





PSYCH: Normal mood, normal affect.





SKIN: Warm, Dry, normal turgor, no rashes or lesions noted.


TRAVEL OUTSIDE OF THE [place] IN LAST 30 DAYS: No





- Related Data


Allergies/Adverse Reactions: 


 





No Known Allergies Allergy (Verified 18 18:43)


 











Past Medical History





- Social History


Chew tobacco use (# tins/day): No


Frequency of alcohol use: None


Drug Abuse: None





- Past Medical History


Cardiac Medical History: Reports: Hx Atrial Fibrillation


   Denies: Hx Pulmonary Embolism


Pulmonary Medical History: Reports: Hx Asthma, Hx COPD, Hx Pneumonia


   Denies: Hx Bronchitis, Hx Respiratory Failure, Hx Sleep Apnea, Hx 

Tuberculosis


Endocrine Medical History: Denies: Hx Graves' Disease


Renal/ Medical History: Denies: Hx End Stage Renal Disease, Hx Kidney Stones, 

Hx Ovarian Cysts, Hx Peritoneal Dialysis, Hx Pelvic Inflammatory Disease


Malignancy Medical History: Denies: Hx Breast Cancer, Hx Cervical Cancer, Hx 

Lung Cancer, Hx Ovarian Cancer


GI Medical History: Reports: Hx Gastroesophageal Reflux Disease, Hx Ulcer.  

Denies: Hx Crohn's Disease, Hx Hiatal Hernia, Hx Irritable Bowel, Hx Liver 

Failure, Hx Pancreatitis


Musculoskeltal Medical History: Reports Hx Arthritis - HANDS, Denies Hx 

Fibromyalgia, Denies Hx Muscular Dystrophy


Psychiatric Medical History: Reports: Hx Depression


   Denies: Hx Bipolar Disorder, Hx Post Traumatic Stress Disorder, Hx 

Schizophrenia


Traumatic Medical History: Denies: Hx Fractures


Past Surgical History: Reports: Hx Abdominal Surgery, Hx Gynecologic Surgery - 

fibroid removal, Hx Hysterectomy, Hx Tubal Ligation.  Denies: Hx Appendectomy, 

Hx Bowel Surgery, Hx  Section, Hx Cholecystectomy, Hx Colostomy, Hx 

Coronary Artery Bypass Graft, Hx Gastric Bypass Surgery, Hx Herniorrhaphy, Hx 

Mastectomy, Hx Pacemaker, Hx Tonsillectomy





- Immunizations


Hx Diphtheria, Pertussis, Tetanus Vaccination: Yes





Physical Exam





- Vital signs


Vitals: 





 











Temp Pulse Resp BP Pulse Ox


 


 98.8 F   105 H  24 H  121/90 H  95 


 


 10/16/18 18:50  10/16/18 18:50  10/16/18 18:50  10/16/18 18:50  10/16/18 18:50














Course





- Vital Signs


Vital signs: 





 











Temp Pulse Resp BP Pulse Ox


 


 98.8 F   105 H  24 H  121/90 H  95 


 


 10/16/18 18:50  10/16/18 18:50  10/16/18 18:50  10/16/18 18:50  10/16/18 18:50














Doctor's Discharge





- Discharge


Referrals: 


BRANDY PARRISH MD [Primary Care Provider] - Follow up as needed

## 2018-10-17 NOTE — ER DOCUMENT REPORT
ED GI/





- General


Chief Complaint: Abdominal Pain


Stated Complaint: SIDE PAIN


Time Seen by Provider: 10/16/18 19:18


Mode of Arrival: Ambulatory


Information source: Patient


Notes: 





Patient is a 61-year-old female comes emergency room complaining of abdominal 

distention.  With some pain on the left side.  Patient states she was seen here 

on Saturday night this past week was diagnosed with gastritis and sent home.  

She states that since  she has not had a bowel movement and today her 

abdomen has swollen up and she is become bloated.  States she last ate 

yesterday because she just does not feel hungry.  When she eats she feels full 

or even on small amounts.  Patient has a history of a hernia repair and 

appendectomy approximately 1 year ago and she is also had an upper EGD in the 

past year as well.  Denies any nausea vomiting or diarrhea as she states she 

has been constipated since .  Denies fever


TRAVEL OUTSIDE OF THE [place] IN LAST 30 DAYS: No





- HPI


Patient complains to provider of: Other - Denies abdominal pain just feels 

bloated.  No: Abdominal pain, Dysuria


Onset: Other - 3 days


Timing/Duration: Gradual, Persistent, Worse


Quality of pain: Cramping


Severity at maximum: Moderate


Severity in ED: Moderate


Pain Level: 3


Location: Other - Diffuse


Sexual history: Inactive


Associated symptoms: Constipation


Exacerbated by: Denies


Relieved by: Denies


Similar symptoms previously: Yes


Recently seen / treated by doctor: Yes





- Related Data


Allergies/Adverse Reactions: 


 





No Known Allergies Allergy (Verified 18 18:43)


 











Past Medical History





- General


Information source: Patient





- Social History


Smoking Status: Current Every Day Smoker


Cigarette use (# per day): Yes


Chew tobacco use (# tins/day): No


Smoking Education Provided: Yes


Frequency of alcohol use: None


Drug Abuse: None


Family History: Reviewed & Not Pertinent


Patient has suicidal ideation: No


Patient has homicidal ideation: No





- Past Medical History


Cardiac Medical History: Reports: Hx Atrial Fibrillation


   Denies: Hx Pulmonary Embolism


Pulmonary Medical History: Reports: Hx Asthma, Hx COPD, Hx Pneumonia


   Denies: Hx Bronchitis, Hx Respiratory Failure, Hx Sleep Apnea, Hx 

Tuberculosis


Endocrine Medical History: Denies: Hx Graves' Disease


Renal/ Medical History: Denies: Hx End Stage Renal Disease, Hx Kidney Stones, 

Hx Ovarian Cysts, Hx Peritoneal Dialysis, Hx Pelvic Inflammatory Disease


Malignancy Medical History: Denies: Hx Breast Cancer, Hx Cervical Cancer, Hx 

Lung Cancer, Hx Ovarian Cancer


GI Medical History: Reports: Hx Gastroesophageal Reflux Disease, Hx Ulcer.  

Denies: Hx Crohn's Disease, Hx Hiatal Hernia, Hx Irritable Bowel, Hx Liver 

Failure, Hx Pancreatitis


Musculoskeletal Medical History: Reports Hx Arthritis - HANDS, Denies Hx 

Fibromyalgia, Denies Hx Muscular Dystrophy


Psychiatric Medical History: Reports: Hx Depression


   Denies: Hx Bipolar Disorder, Hx Post Traumatic Stress Disorder, Hx 

Schizophrenia


Traumatic Medical History: Denies: Hx Fractures


Past Surgical History: Reports: Hx Abdominal Surgery, Hx Gynecologic Surgery - 

fibroid removal, Hx Hysterectomy, Hx Tubal Ligation.  Denies: Hx Appendectomy, 

Hx Bowel Surgery, Hx  Section, Hx Cholecystectomy, Hx Colostomy, Hx 

Coronary Artery Bypass Graft, Hx Gastric Bypass Surgery, Hx Herniorrhaphy, Hx 

Mastectomy, Hx Pacemaker, Hx Tonsillectomy





- Immunizations


Hx Diphtheria, Pertussis, Tetanus Vaccination: Yes





Review of Systems





- Review of Systems


Constitutional: No symptoms reported


EENT: No symptoms reported


Cardiovascular: No symptoms reported


Respiratory: No symptoms reported


Gastrointestinal: Constipation, Other


Genitourinary: No symptoms reported


Female Genitourinary: No symptoms reported


Musculoskeletal: No symptoms reported


Skin: No symptoms reported


Hematologic/Lymphatic: No symptoms reported


Neurological/Psychological: No symptoms reported


-: Yes All other systems reviewed and negative





Physical Exam





- Vital signs


Vitals: 





 











Temp Pulse Resp BP Pulse Ox


 


 98.8 F   105 H  24 H  121/90 H  95 


 


 10/16/18 18:50  10/16/18 18:50  10/16/18 18:50  10/16/18 18:50  10/16/18 18:50











Interpretation: Tachycardic





- Notes


Notes: 





Patient is a well-nourished well-developed 61-year-old female.  She is in no 

apparent distress on physical examination.  He does appear in slight discomfort.





- General


General appearance: Alert


In distress: None





- HEENT


Head: Normocephalic, Atraumatic


Eyes: Normal


Conjunctiva: Normal


Tympanic membrane: Normal


Sinus: Normal


Nasal: Normal


Mouth/Lips: Normal


Mucous membranes: Normal


Pharynx: Normal.  No: Peritonsillar abscess, Post nasal drainage, 

Retropharyngeal abscess, Tonsillar hypertrophy, Uvular edema, Potential airway 

comprom.


Neck: Normal, Supple.  No: Anterior cervical chain, Posterior cervical chain, 

Meningismus, Neck mass, Shotty nodes, Subcutaneous emphysema





- Respiratory


Respiratory status: No respiratory distress


Chest status: Nontender


Breath sounds: Normal


Chest palpation: Normal





- Cardiovascular


Rhythm: Regular, Tachycardia


Heart sounds: Normal auscultation


Murmur: No





- Abdominal


Inspection: Normal


Distension: Distended, Tympanitic.  No: Fluid wave, Distended bladder, Other


Bowel sounds: Normal


Tenderness: Nontender.  No: Tender, McBurney's point, Ohara's sign, Guarding, 

Rebound


Organomegaly: No organomegaly





- Back


Back: Normal, Nontender.  No: Deformity/step-off, CVA tenderness, Vertebra 

tenderness, Scars, Scoliosis, Wounds





- Extremities


General upper extremity: Normal inspection, Nontender, Normal ROM, Normal 

strength


General lower extremity: Normal inspection, Nontender, Normal ROM, Normal 

strength, Normal weight bearing





- Neurological


Neuro grossly intact: Yes


Cognition: Normal


Orientation: AAOx4


Nick Coma Scale Eye Opening: Spontaneous


[place] Coma Scale Verbal: Oriented


[place] Coma Scale Motor: Obeys Commands


[place] Coma Scale Total: 15


Speech: Normal





- Skin


Skin Temperature: Warm


Skin Moisture: Dry


Skin Color: Normal, Pink





Course





- Re-evaluation


Re-evalutation: 





10/17/18 02:05


I have checked on patient multiple times she is resting comfortably.  Patient 

had a finding on her CT that is nonspecific could not find any noted bowel 

obstructions.  Did not read that there was extensive amount of distention but 

no known or CT.  Went back in and talk to patient and she feels like she wants 

to go home and try to take something to help her have a bowel movement.  She 

states that if the pain gets worse or she is unable to eat without vomiting she 

will return to ER.





- Vital Signs


Vital signs: 





 











Temp Pulse Resp BP Pulse Ox


 


 98.3 F   97   17   118/77   97 


 


 10/17/18 00:10  10/17/18 00:10  10/17/18 00:10  10/17/18 00:10  10/17/18 00:10














- Laboratory


Result Diagrams: 


 10/16/18 19:42





 10/16/18 19:42





Discharge





- Discharge


Clinical Impression: 


 Abdominal distention





Abdominal pain


Qualifiers:


 Abdominal location: generalized Qualified Code(s): R10.84 - Generalized 

abdominal pain





Disposition: HOME, SELF-CARE


Instructions:  Abdominal Pain ([facility])


Additional Instructions: 


Home and rest.  You may attempt to use the milk of magnesia as we discussed.  

Generic is as good as name brand.  A comes with a shot glass type of a 

measuring cup on the bottle.  Tonight before bed drink 2 of those shot glass 

full followed by 12 ounces of warm tap water.  In the morning when you wake up 

drink coffee or tea it was stimulated to have a nice fluid bowel movement.  As 

we discussed if you should have increasing pain or inability to eat without 

throwing up return to ER at once.  I have given you the name of the 

gastroenterologist on call for us tonight.  You may contact his office in the 

morning to see if he can accommodate you.  You may also contact your primary 

doctor to see if he would like to see you in the office as well.  Should you 

have increasing discomfort and or are unable to get to 1 of these doctors you 

wish to return for me to reevaluate you tomorrow evening I am on from 7 PM to 7 

AM tomorrow night again.  My name is Logan Davidson and you may ask for me if you 

wish to.  Again should he have any other concerns or worsening of your symptoms 

prior to that return to ER for a recheck then.


Forms:  Smoking Cessation Education


Referrals: 


BRANDY PARRISH MD [Primary Care Provider] - Follow up as needed


RICARDO BRUMFIELD MD [ACTIVE STAFF] - Follow up as needed

## 2018-11-10 NOTE — ER DOCUMENT REPORT
ED General





- General


Chief Complaint: Asthma Exacerbation


Stated Complaint: BREATHING DIFFICULTY


Time Seen by Provider: 11/10/18 13:27


Notes: 





Patient is a 61-year-old female with COPD and asthma that presents to the 

emergency department for chief complaint of shortness of breath and wheezing.  

Patient reports that she has been having worsening of her COPD symptoms over 

the last week, she recently was on prednisone 20 mg daily last week, but her 

symptoms did not significantly improve, and were definitely worse over the last 

2 days.  She is been using her nebulizer, as well as her home inhaled 

corticosteroids, without improvement.  She denies noting any chest pain, fevers

, chills, cough, nausea or vomiting.  She is not on home oxygen.





Past Medical History: COPD, asthma


Past Surgical History: Appendectomy


Social History: Admits to smoking cigarettes daily, denies alcohol or drug use


Family History: Reviewed and noncontributory for presenting illness


Allergies: Reviewed, see documented allergy list. 





REVIEW OF SYSTEMS:


Other than noted above, the 12 point review of systems was reviewed with the 

patient and were negative, all pertinent findings are included in the HPI.





PHYSICAL EXAMINATION:





Vital signs reviewed, nursing noted reviewed. 





GENERAL: Well-appearing, well-nourished and in no acute distress.





HEAD: Atraumatic, normocephalic.





EYES: Eyes appear normal, extraocular movements intact, sclera anicteric, 

conjunctiva are normal.





ENT: nares patent, oropharynx clear without exudates.  Moist mucous membranes.





NECK: Normal range of motion, supple without lymphadenopathy





LUNGS: Breath sounds clear to auscultation bilaterally and equal.  Mild 

expiratory wheezing noted throughout all lung fields, no respiratory distress





HEART: Regular rate and rhythm without murmurs





ABDOMEN: Soft, nontender, normoactive bowel sounds.  No rebound, guarding, or 

rigidity. No masses appreciated.





EXTREMITIES: Nontender, good range of motion, no pitting or edema.  





NEUROLOGICAL: No focal neurological deficits. Moves all extremities 

spontaneously Motor and sensory grossly intact on exam.





PSYCH: Normal mood, normal affect.





SKIN: Warm, Dry, normal turgor, no rashes or lesions noted on exposed skin





TRAVEL OUTSIDE OF THE [place] IN LAST 30 DAYS: No





- Related Data


Allergies/Adverse Reactions: 


 





No Known Allergies Allergy (Verified 11/10/18 13:29)


 











Past Medical History





- Social History


Smoking Status: Current Some Day Smoker


Chew tobacco use (# tins/day): No


Frequency of alcohol use: None


Drug Abuse: None


Family History: Reviewed & Not Pertinent


Patient has suicidal ideation: No


Patient has homicidal ideation: No





- Past Medical History


Cardiac Medical History: Reports: Hx Atrial Fibrillation


   Denies: Hx Pulmonary Embolism


Pulmonary Medical History: Reports: Hx Asthma, Hx COPD, Hx Pneumonia


   Denies: Hx Bronchitis, Hx Respiratory Failure, Hx Sleep Apnea, Hx 

Tuberculosis


Endocrine Medical History: Denies: Hx Graves' Disease


Renal/ Medical History: Denies: Hx End Stage Renal Disease, Hx Kidney Stones, 

Hx Ovarian Cysts, Hx Peritoneal Dialysis, Hx Pelvic Inflammatory Disease


Malignancy Medical History: Denies: Hx Breast Cancer, Hx Cervical Cancer, Hx 

Lung Cancer, Hx Ovarian Cancer


GI Medical History: Reports: Hx Gastroesophageal Reflux Disease, Hx Ulcer.  

Denies: Hx Crohn's Disease, Hx Hiatal Hernia, Hx Irritable Bowel, Hx Liver 

Failure, Hx Pancreatitis


Musculoskeletal Medical History: Reports Hx Arthritis - HANDS, Denies Hx 

Fibromyalgia, Denies Hx Muscular Dystrophy


Psychiatric Medical History: Reports: Hx Depression


   Denies: Hx Bipolar Disorder, Hx Post Traumatic Stress Disorder, Hx 

Schizophrenia


Traumatic Medical History: Denies: Hx Fractures


Past Surgical History: Reports: Hx Abdominal Surgery, Hx Gynecologic Surgery - 

fibroid removal, Hx Hysterectomy, Hx Tubal Ligation.  Denies: Hx Appendectomy, 

Hx Bowel Surgery, Hx  Section, Hx Cholecystectomy, Hx Colostomy, Hx 

Coronary Artery Bypass Graft, Hx Gastric Bypass Surgery, Hx Herniorrhaphy, Hx 

Mastectomy, Hx Pacemaker, Hx Tonsillectomy





- Immunizations


Hx Diphtheria, Pertussis, Tetanus Vaccination: Yes





Physical Exam





- Vital signs


Vitals: 


 











Temp Pulse Resp BP Pulse Ox


 


 98.6 F   112 H  28 H  119/91 H  97 


 


 11/10/18 13:15  11/10/18 13:15  11/10/18 13:15  11/10/18 13:15  11/10/18 13:15














Course





- Re-evaluation


Re-evalutation: 





Patient seen and examined vital signs reviewed. 





Laboratory data and imaging were ordered as appropriate for the patient's 

presenting symptoms and complaint, with consideration of any critical or life 

threatening conditions that may be associated with their obtained history and 

exam as noted above.





Patient was treated with oral prednisone 60 mg, and do not breathing treatment





Results were reviewed when available and demonstrated negative chest x-ray, 

unremarkable blood work





The patient was re-evaluated and was improved, and felt comfortable being 

discharged, she is not hypoxic





Evaluation was most consistent with acute exacerbation of COPD, I feel the 

patient can be discharged, will prescribe prednisone 60 mg daily, encouraged 

her to continue using her prescribed medications, and to follow-up with her 

pulmonologist as scheduled on the , and to discuss possible sleep apnea as 

she has had some of these symptoms at home.





Results were discussed with the patient at this point, after careful 

consideration I feel that that patient can be discharged from the emergency 

department, the patient was educated treatments and reasons to return to the 

emergency department based on their presumed diagnosis as noted above, they 

were advised to followup with a primary care physician in 2-3 days. Patient was 

agreeable to plan of care.





*Note is created using voice recognition software and may contain spelling, 

syntax or grammatical errors.








Laboratory











  11/10/18 11/10/18





  13:40 13:40


 


WBC  4.5 


 


RBC  5.05 


 


Hgb  15.5 


 


Hct  46.2 


 


MCV  91 


 


MCH  30.8 


 


MCHC  33.7 


 


RDW  13.7 


 


Plt Count  206 


 


Seg Neutrophils %  50.8 


 


Lymphocytes %  33.5 


 


Monocytes %  11.1 


 


Eosinophils %  3.7 


 


Basophils %  0.9 


 


Absolute Neutrophils  2.3 


 


Absolute Lymphocytes  1.5 


 


Absolute Monocytes  0.5 


 


Absolute Eosinophils  0.2 


 


Absolute Basophils  0.0 


 


Sodium   142.4


 


Potassium   4.4


 


Chloride   108 H


 


Carbon Dioxide   25


 


Anion Gap   9


 


BUN   6 L


 


Creatinine   0.74


 


Est GFR ( Amer)   > 60


 


Est GFR (Non-Af Amer)   > 60


 


Glucose   92


 


Calcium   9.7


 


Total Bilirubin   0.4


 


Direct Bilirubin   0.3


 


Neonat Total Bilirubin   Not Reportable


 


Neonat Direct Bilirubin   Not Reportable


 


Neonat Indirect Bili   Not Reportable


 


AST   20


 


ALT   21


 


Alkaline Phosphatase   83


 


Total Protein   6.6


 


Albumin   3.8














- Vital Signs


Vital signs: 


 











Temp Pulse Resp BP Pulse Ox


 


 98.6 F   112 H  28 H  119/91 H  97 


 


 11/10/18 13:15  11/10/18 13:15  11/10/18 13:15  11/10/18 13:15  11/10/18 13:15














- Laboratory


Result Diagrams: 


 11/10/18 13:40





 11/10/18 13:40


Laboratory results interpreted by me: 


 











  11/10/18





  13:40


 


Chloride  108 H


 


BUN  6 L














- Diagnostic Test


Radiology reviewed: Image reviewed





Discharge





- Discharge


Clinical Impression: 


 Acute exacerbation of chronic obstructive pulmonary disease (COPD)





Condition: Stable


Disposition: HOME, SELF-CARE


Instructions:  Chronic Obstructive Lung Disease ([facility])


Additional Instructions: 


Please return to the emergency department if you have any worsening, or concern 

of your symptoms.





Please return to the emergency department if you develop chest pain, difficulty 

breathing, severe abdominal pain, or ongoing vomiting.





Please follow-up with your primary care physician in 2-3 days and any other 

recommended physicians.





If prescribed, take all medications as directed.  





If you have any questions or concerns do not hesitate to return the emergency 

department for evaluation. 





Please follow-up with your pulmonologist as scheduled, and discuss possible 

sleep apnea evaluation.


Prescriptions: 


Prednisone [Deltasone 20 mg Tablet] 3 tab PO DAILY 5 Days #15 tablet


Referrals: 


LUIZ SAINI MD [Primary Care Provider] - Follow up in 3-5 days

## 2018-11-10 NOTE — RADIOLOGY REPORT (SQ)
EXAM DESCRIPTION:  CHEST 2 VIEWS



COMPLETED DATE/TIME:  11/10/2018 2:21 pm



REASON FOR STUDY:  COPD exacerbation, chest tightness



COMPARISON:  8/25/2018



EXAM PARAMETERS:  NUMBER OF VIEWS: two views

TECHNIQUE: Digital Frontal and Lateral radiographic views of the chest acquired.

RADIATION DOSE: NA

LIMITATIONS: none



FINDINGS:  LUNGS AND PLEURA: Mild flattening of the hemidiaphragms.  No opacities, masses or pneumoth
orax. No pleural effusion.

MEDIASTINUM AND HILAR STRUCTURES: No masses or contour abnormalities.

HEART AND VASCULAR STRUCTURES: Heart normal size.  No evidence for failure.  Calcifications of the ao
rtic knob.

BONES: No acute findings.

HARDWARE: None in the chest.  Metallic clips in the epigastric region.  Suture anchor projects over t
he right humeral head.

OTHER: No other significant finding.



IMPRESSION:  NO ACUTE RADIOGRAPHIC FINDING IN THE CHEST.



TECHNICAL DOCUMENTATION:  JOB ID:  [id number]

 2011 [employer]- All Rights Reserved



Reading location - IP/workstation name: [email or web address]

## 2018-11-10 NOTE — ER DOCUMENT REPORT
ED Medical Screen (RME)





- General


Chief Complaint: Asthma Exacerbation


Stated Complaint: BREATHING DIFFICULTY


Time Seen by Provider: 11/10/18 13:27


Notes: 





61-year-old female patient with history of allergic asthma and COPD who 

continues to smoke.  Onset 1-2 days of increasing wheezing difficulty 

breathing.  Nonproductive cough.  No fever.  She takes Brio and Spiriva.


She saw Dr. Saini on 2018, was prescribed prednisone 10 mg once daily for 

5 days.  She was told if that did not work she would need to come to the 

emergency room to get a shot for her asthma.


She was also on prednisone 60 mg daily for 5 days prescribed in the emergency 

room on 10/13/2018.





I have greeted and performed a rapid initial assessment of this patient.  A 

comprehensive ED assessment and evaluation of the patient, analysis of test 

results and completion of the medical decision making process will be conducted 

by additional ED providers.


TRAVEL OUTSIDE OF THE [place] IN LAST 30 DAYS: No





- Related Data


Allergies/Adverse Reactions: 


 





No Known Allergies Allergy (Verified 11/10/18 13:29)


 











Past Medical History





- Social History


Chew tobacco use (# tins/day): No


Frequency of alcohol use: None


Drug Abuse: None





- Past Medical History


Cardiac Medical History: Reports: Hx Atrial Fibrillation


   Denies: Hx Pulmonary Embolism


Pulmonary Medical History: Reports: Hx Asthma, Hx COPD, Hx Pneumonia


   Denies: Hx Bronchitis, Hx Respiratory Failure, Hx Sleep Apnea, Hx 

Tuberculosis


Endocrine Medical History: Denies: Hx Graves' Disease


Renal/ Medical History: Denies: Hx End Stage Renal Disease, Hx Kidney Stones, 

Hx Ovarian Cysts, Hx Peritoneal Dialysis, Hx Pelvic Inflammatory Disease


Malignancy Medical History: Denies: Hx Breast Cancer, Hx Cervical Cancer, Hx 

Lung Cancer, Hx Ovarian Cancer


GI Medical History: Reports: Hx Gastroesophageal Reflux Disease, Hx Ulcer.  

Denies: Hx Crohn's Disease, Hx Hiatal Hernia, Hx Irritable Bowel, Hx Liver 

Failure, Hx Pancreatitis


Musculoskeltal Medical History: Reports Hx Arthritis - HANDS, Denies Hx 

Fibromyalgia, Denies Hx Muscular Dystrophy


Psychiatric Medical History: Reports: Hx Depression


   Denies: Hx Bipolar Disorder, Hx Post Traumatic Stress Disorder, Hx 

Schizophrenia


Traumatic Medical History: Denies: Hx Fractures


Past Surgical History: Reports: Hx Abdominal Surgery, Hx Gynecologic Surgery - 

fibroid removal, Hx Hysterectomy, Hx Tubal Ligation.  Denies: Hx Appendectomy, 

Hx Bowel Surgery, Hx  Section, Hx Cholecystectomy, Hx Colostomy, Hx 

Coronary Artery Bypass Graft, Hx Gastric Bypass Surgery, Hx Herniorrhaphy, Hx 

Mastectomy, Hx Pacemaker, Hx Tonsillectomy





- Immunizations


Hx Diphtheria, Pertussis, Tetanus Vaccination: Yes





Physical Exam





- Vital signs


Vitals: 


 











Temp Pulse Resp BP Pulse Ox


 


 98.6 F   112 H  28 H  119/91 H  97 


 


 11/10/18 13:15  11/10/18 13:15  11/10/18 13:15  11/10/18 13:15  11/10/18 13:15














Course





- Vital Signs


Vital signs: 


 











Temp Pulse Resp BP Pulse Ox


 


 98.6 F   112 H  28 H  119/91 H  97 


 


 11/10/18 13:15  11/10/18 13:15  11/10/18 13:15  11/10/18 13:15  11/10/18 13:15














Doctor's Discharge





- Discharge


Referrals: 


LUIZ SAINI MD [Primary Care Provider] - Follow up as needed

## 2018-12-11 NOTE — RADIOLOGY REPORT (SQ)
EXAM DESCRIPTION:  CT CHEST WITHOUT



COMPLETED DATE/TIME:  12/11/2018 9:35 am



REASON FOR STUDY:  PULMONARY INFILTRATE R91.8  OTHER NONSPECIFIC ABNORMAL FINDING OF LUNG FIELD



COMPARISON:  CT-CTA chest examination dated 7/25/2017



TECHNIQUE:  CT scan performed of the chest without intravenous contrast.  Images reviewed with lung, 
soft tissue and bone windows.  Reconstructed coronal and sagittal MPR images reviewed.  All images st
ored on PACS.

All CT scanners at this facility use dose modulation, iterative reconstruction, and/or weight based d
osing when appropriate to reduce radiation dose to as low as reasonably achievable (ALARA).

[facility]: Dose Right  [facility]: CareDose    [facility]: Dose Right    [facility]: Teradose 4D    [facility]: Smart Luminate



RADIATION DOSE:  Total exam DLP:  190.19 mGy.



LIMITATIONS:  No technical limitations.



FINDINGS:  LUNGS AND PLEURA:  Emphysematous changes in the lungs, unchanged finding.  Stable small gr
ound-glass 4-5 mm nodule in the left upper lobe, axial image 27, series 4.  Small solid 2-3 mm nodule
 in the periphery of the right apex-right upper lobe, axial image 18, series 4 Stable minimal pleural
 nodularity.  Mild thickening of the bronchial walls, stable findings.  No pneumothorax or pleural ef
fusion.  The central airways are clear.

HILAR AND MEDIASTINAL STRUCTURES: No significant interval changes.

HEART AND VASCULAR STRUCTURES:  Atherosclerotic changes involving the thoracic aorta.  Coronary arter
y calcifications.  No aneurysm.  No pericardial effusion.

UPPER ABDOMEN: No significant interval changes. Limited exam.

THYROID AND OTHER SOFT TISSUES:  Stable appearance to the thyroid gland which is heterogenous in appe
arance.  The hypoattenuated nodule in the right lobe is unchanged.

BONES:  The osseous structures are stable in appearance.

HARDWARE: None in the chest.

OTHER: No other significant findings.



IMPRESSION:  1. NO SIGNIFICANT interval changes since the previous CT-CTA chest examination dated 7/2
5/2017.  Emphysematous changes in the lungs, stable findings.

2.  Stable small subcentimeter ground-glass nodule in the left upper lobe and solid nodule in the rig
ht apex-upper lobe.



TECHNICAL DOCUMENTATION:  JOB ID:  [id number]

Quality ID # 436: Final reports with documentation of one or more dose reduction techniques (e.g., Au
tomated exposure control, adjustment of the mA and/or kV according to patient size, use of iterative 
reconstruction technique)

 2011 [employer]- All Rights Reserved



Reading location - IP/workstation name: [email or web address]

## 2019-08-22 NOTE — ER DOCUMENT REPORT
HPI





- HPI


Time Seen by Provider: 19 18:21


Pain Level: 1





- REPRODUCTIVE


Reproductive: DENIES: Pregnant:





Past Medical History





- General


Information source: Patient





- Social History


Smoking Status: Unknown if Ever Smoked


Family History: Reviewed & Not Pertinent





- Past Medical History


Cardiac Medical History: Reports: Hx Atrial Fibrillation


   Denies: Hx Coronary Artery Disease, Hx Heart Attack, Hx Hypertension, Hx 

Pulmonary Embolism


Pulmonary Medical History: Reports: Hx Asthma, Hx COPD, Hx Pneumonia


   Denies: Hx Bronchitis, Hx Respiratory Failure, Hx Sleep Apnea, Hx 

Tuberculosis


Neurological Medical History: Denies: Hx Cerebrovascular Accident, Hx Seizures


Endocrine Medical History: Denies: Hx Graves' Disease


Renal/ Medical History: Denies: Hx End Stage Renal Disease, Hx Kidney Stones, 

Hx Ovarian Cysts, Hx Peritoneal Dialysis, Hx Pelvic Inflammatory Disease


Malignancy Medical History: Denies: Hx Breast Cancer, Hx Cervical Cancer, Hx 

Lung Cancer, Hx Ovarian Cancer


GI Medical History: Reports: Hx Gastroesophageal Reflux Disease, Hx Ulcer.  

Denies: Hx Crohn's Disease, Hx Hiatal Hernia, Hx Irritable Bowel, Hx Liver 

Failure, Hx Pancreatitis


Musculoskeletal Medical History: Reports Hx Arthritis - HANDS, Denies Hx 

Fibromyalgia, Denies Hx Muscular Dystrophy, Denies Hx Systemic Lupus 

Erythematosus


Psychiatric Medical History: Reports: Hx Depression


   Denies: Hx Bipolar Disorder, Hx Post Traumatic Stress Disorder, Hx 

Schizophrenia


Traumatic Medical History: Denies: Hx Fractures


Past Surgical History: Reports: Hx Abdominal Surgery, Hx Gynecologic Surgery - 

fibroid removal, Hx Hysterectomy, Hx Tubal Ligation.  Denies: Hx Appendectomy, 

Hx Bowel Surgery, Hx  Section, Hx Cholecystectomy, Hx Colostomy, Hx 

Coronary Artery Bypass Graft, Hx Gastric Bypass Surgery, Hx Herniorrhaphy, Hx 

Mastectomy, Hx Pacemaker, Hx Tonsillectomy





- Immunizations


Hx Diphtheria, Pertussis, Tetanus Vaccination: Yes





Vertical Provider Document





- CONSTITUTIONAL


Agree With Documented VS: Yes


Exam Limitations: No Limitations


General Appearance: WD/WN


Notes: 





PHYSICAL EXAMINATION:





GENERAL: Well-appearing, well-nourished and in no acute distress.





HEAD: Atraumatic, normocephalic.





EYES: Pupils equal round and reactive to light, extraocular movements intact, 

conjunctiva are normal.





ENT: Nares patent, oropharynx clear without exudates.  Moist mucous membranes.





NECK: Normal range of motion, supple without lymphadenopathy





LUNGS: Breath sounds clear to auscultation bilaterally and equal.  No wheezes 

rales or rhonchi. 





HEART: Regular rate and rhythm without murmurs





ABDOMEN: Soft, nontender, nondistended abdomen.  No guarding, no rebound.  No 

masses appreciated.





Female : deferred





Musculoskeletal: Normal range of motion, no pitting or edema.  No cyanosis. left

lateral posterior ankle with swelling, tenderness that radiates to left heel. 

pain with inversion. squeeze test negative. dtr +2 BLE. Limited APROM. distal 

pulses + 2 BLE equally. Full motor and sensory function of bilateral lower 

extremities. No noted open wounds or  abrasion. Normal gait. No  vascular 

compromise. Peroneal nerve is intact with strong eversion and plantar flexion. 

Negative anterior drawer test. 





NEUROLOGICAL: Cranial nerves grossly intact.  Normal speech, normal gait.  

Normal sensory, motor exams





PSYCH: Normal mood, normal affect.





SKIN: Warm, Dry, normal turgor, no rashes or lesions noted.  Scant erythema 

slightly to left lateral posterior ankle approximately 0.5 cm x 0.5 cm





- INFECTION CONTROL


TRAVEL OUTSIDE OF THE [place] IN LAST 30 DAYS: No





Course





- Re-evaluation


Re-evalutation: 





19 20:13


Afebrile, slightly tachycardic however patient states that she took her COPD 

medication and inhaler which can make her slightly tachycardic, [place] controlled 

substance website evaluated, patient is not any current stimulants.  Patient 

thinks she may have been bitten by a bug, area without erythema induration, 

scant swelling.  X-ray of left ankle is NAD per this read.  Will prescribe 5 

days of Keflex twice daily for 5 days, apply heat 20 minutes on 20 minutes off 

several times a day.Patient presents with symptoms most consistent with an acute

cellulitis.  Vitals within normal limits.  Patient does not meet sepsis criteria

is overall very well in appearance. Exam and history are not consistent with 

DVT.  At this time will discharge with return precautions and follow-up 

recommendations.  Verbal discharge instructions given a the bedside and 

opportunity for questions given. Medication warnings reviewed. Patient is in 

agreement with this plan and has verbalized understanding of return precautions 

and the need for primary care follow-up in the next 24-72 hours.








- Vital Signs


Vital signs: 


                                        











Temp Pulse Resp BP Pulse Ox


 


 98.5 F   118 H  15   129/77 H  93 


 


 19 18:16  19 18:16  19 18:16  19 18:16  19 18:16














Discharge





- Discharge


Clinical Impression: 


 Left ankle pain, Cellulitis





Condition: Stable


Disposition: HOME, SELF-CARE


Instructions:  Cellulitis ([facility])


Additional Instructions: 


Take antibiotic as directed.  X-ray was negative, no foreign body fracture 

dislocation.  Follow-up with orthopedic specialist as needed .  Follow-up with 

your primary care provider within 3 days.   Apply heat 20 minutes on 20 minutes 

off several times a day.





Return immediately for any new or worsening symptoms.





Follow up with primary care provider, call tomorrow to make followup 

appointment.


Prescriptions: 


Cephalexin Monohydrate [Keflex 500 mg Capsule] 500 mg PO BID #10 capsule


Referrals: 


BRANDY PARRISH MD [Primary Care Provider] - Follow up as needed

## 2019-08-22 NOTE — RADIOLOGY REPORT (SQ)
EXAM DESCRIPTION:

Left ankle

RadLex: XR ANKLE 3 OR MORE VIEWS 

Views:  3 



CLINICAL HISTORY:

62 years Female, left ankle pain



COMPARISON:

None.



FINDINGS:

 Negative for acute fracture, dislocation, or radiopaque foreign

body.



IMPRESSION: 

1.  No acute findings.

## 2022-08-11 NOTE — PDOC PROGRESS REPORT
Subjective


Progress Note for:: 08/20/17


Subjective:: 





patient is comfortable, denies flatus or stools, and reports minimal incisional 

pain





Physical Exam


Vital Signs: 


 











Temp Pulse Resp BP Pulse Ox


 


 98.1 F   82   16   130/81 H  96 


 


 08/20/17 12:00  08/20/17 12:30  08/20/17 12:30  08/20/17 12:00  08/20/17 12:30








 Intake & Output











 08/19/17 08/20/17 08/21/17





 06:59 06:59 06:59


 


Intake Total 5550 4200 0


 


Output Total 3355 1520 1400


 


Balance 2195 2680 -1400


 


Weight 64 kg 64 kg 











Respiratory exam: PRESENT: clear to auscultation taniya


Cardiovascular exam: PRESENT: RRR


GI/Abdominal exam: PRESENT: hypoactive bowel sounds, soft, other - wound c/d/i





Results


Laboratory Results: 


 





 08/20/17 05:28 





 08/20/17 05:28 





 











  08/20/17 08/20/17





  05:28 05:28


 


WBC  7.9 


 


RBC  3.90 


 


Hgb  12.1 


 


Hct  35.5 L 


 


MCV  91 


 


MCH  31.0 


 


MCHC  34.1 


 


RDW  13.9 


 


Plt Count  227 


 


Sodium   137.9


 


Potassium   4.0


 


Chloride   108 H


 


Carbon Dioxide   25


 


Anion Gap   5


 


BUN   8


 


Creatinine   0.53


 


Est GFR ( Amer)   > 60


 


Est GFR (Non-Af Amer)   > 60


 


Glucose   96


 


Calcium   9.2














Assessment & Plan





- Diagnosis








(2) Colonic mass


Is this a current diagnosis for this admission?: Yes   





- Plan Summary


Plan Summary: 





A/








POD#3 after righe hemicolectomy, repair of right Spigelian hernia and 

incidental appendectomy


patient comfortable, denies bowel function


VSS, AF


blood work unremarkable


PE shows a soft abdomen with clean wound





P/


Continue NPO IVF until bowel function returns


Continue NGT suction 


IV steroids and Lovenox as per Dr. Saini Parent(s)

## 2022-09-03 NOTE — DISCHARGE SUMMARY E
Discharge Summary



NAME: EWELINA POTTS

MRN:  [id number]        : 1956     AGE: 60Y

ADMITTED: 2017                 DISCHARGED: 2017



DISCHARGE DIAGNOSES:

1.  Tubulovillous adenoma of the right colon.

2.  Spigelian hernia

3.  Hyperthyroidism.



PROCEDURE PERFORMED DURING HOSPITALIZATION:

Right hemicolectomy with spigelian hernia repair performed by Dr. Clint Finley on 2017.



HOSPITAL COURSE:

Patient underwent the above-mentioned procedure.  She did well

postoperatively.  She had gradual resumption of bowel function.  She was

noted with tachycardia preoperatively as well as immediately

postoperatively that may have been attributable to hyperthyroidism. 

Remainder of her hospital stay she had stable vital signs.  Patient has

now been discharged to home in good condition.  She will follow up with me

this Friday for staple removal.  She may resume her home medications. 

Additionally medications are Percocet 1 p.o. every 4 hours p.r.n. pain and

Colace 100 mg p.o. b.i.d.  I will have her refer to Endocrinology for her

hyperthyroidism and will also refer her to Dr. Sin for her thyroid

nodule.  She is encouraged to stay active at home.  She may follow a

regular diet at home.





DICTATING PHYSICIAN:  CLINT FINLEY M.D.





[id number]                  DT: [id number]

Y#: [id number]            DD: [id number]

ID:   [id number]           JOB#: [id number]       ACCT: [id number]



cc:CLINT FINLEY M.D.

> Pt has slurred speech, but no complaints, and is ready for d/c to police confinement.